# Patient Record
Sex: FEMALE | Race: WHITE | NOT HISPANIC OR LATINO | Employment: FULL TIME | URBAN - METROPOLITAN AREA
[De-identification: names, ages, dates, MRNs, and addresses within clinical notes are randomized per-mention and may not be internally consistent; named-entity substitution may affect disease eponyms.]

---

## 2017-03-15 ENCOUNTER — OFFICE VISIT (OUTPATIENT)
Dept: FAMILY MEDICINE CLINIC | Facility: CLINIC | Age: 27
End: 2017-03-15

## 2017-03-15 ENCOUNTER — APPOINTMENT (OUTPATIENT)
Dept: LAB | Facility: HOSPITAL | Age: 27
End: 2017-03-15

## 2017-03-15 VITALS
HEIGHT: 69 IN | SYSTOLIC BLOOD PRESSURE: 126 MMHG | WEIGHT: 254 LBS | TEMPERATURE: 98.8 F | BODY MASS INDEX: 37.62 KG/M2 | HEART RATE: 89 BPM | DIASTOLIC BLOOD PRESSURE: 80 MMHG | OXYGEN SATURATION: 98 %

## 2017-03-15 DIAGNOSIS — E66.9 OBESITY (BMI 30-39.9): ICD-10-CM

## 2017-03-15 DIAGNOSIS — R53.83 OTHER FATIGUE: Primary | ICD-10-CM

## 2017-03-15 LAB — TSH SERPL DL<=0.05 MIU/L-ACNC: 1.43 MIU/ML (ref 0.35–5.35)

## 2017-03-15 PROCEDURE — 84443 ASSAY THYROID STIM HORMONE: CPT | Performed by: PHYSICIAN ASSISTANT

## 2017-03-15 PROCEDURE — 36415 COLL VENOUS BLD VENIPUNCTURE: CPT | Performed by: PHYSICIAN ASSISTANT

## 2017-03-15 PROCEDURE — 99213 OFFICE O/P EST LOW 20 MIN: CPT | Performed by: PHYSICIAN ASSISTANT

## 2017-03-15 PROCEDURE — 99406 BEHAV CHNG SMOKING 3-10 MIN: CPT | Performed by: PHYSICIAN ASSISTANT

## 2017-03-15 RX ORDER — LEVOTHYROXINE SODIUM 0.03 MG/1
25 TABLET ORAL DAILY
Qty: 30 TABLET | Refills: 1 | Status: SHIPPED | OUTPATIENT
Start: 2017-03-15 | End: 2017-05-13 | Stop reason: SDUPTHER

## 2017-03-15 NOTE — PROGRESS NOTES
Subjective   Brenton Vila is a 27 y.o. female    History of Present Illness    Patient presents today for evaluation of obesity.  Patient states that her weight has continued to increase over the past couple of years despite following a diet low in calories, lowering carbs, high protein and staying active.  She states that prior to her 2-year-olds birth she weighed 165 pounds.  She states the highest her weight got when she was pregnant with her daughter was 211 pounds.  She got back down to 180 pounds after her daughter was born.  She states soon after that she developed increased weight without any significant change in her diet or activity and has had difficulty reducing it.  She states since December she has been following a diet less than 45 carbs per meal, exercising 30 minutes 3 times a week, she's cut out all fast food, she is stopped drinking sodas and has decreased her calories overall to less than 1200 jorge luis per day.  She states she has been diagnosed with polycystic ovary and syndrome was given metformin which did not help her weight.  She has only lost 1/2 pounds since December.  She is feeling very frustrated by this weight gain.  Additional she's felt more fatigue lately.  The following portions of the patient's history were reviewed and updated as appropriate: allergies, current medications, past social history and problem list    Review of Systems   Constitutional: Negative for activity change, appetite change and unexpected weight change.   Cardiovascular: Negative for chest pain.   Gastrointestinal: Negative for abdominal distention, abdominal pain, diarrhea and nausea.   Psychiatric/Behavioral: Negative for dysphoric mood. The patient is not nervous/anxious.        Objective     Vitals:    03/15/17 0813   BP: 126/80   Pulse: 89   Temp: 98.8 °F (37.1 °C)   SpO2: 98%       Physical Exam   Constitutional: She appears well-developed and well-nourished.   Obesity noted     Neck: No thyromegaly present.    Cardiovascular: Normal rate and regular rhythm.    Pulmonary/Chest: Effort normal and breath sounds normal.   Abdominal: Soft. There is no tenderness.   Psychiatric: She has a normal mood and affect. Her behavior is normal. Judgment and thought content normal.   Nursing note and vitals reviewed.    I counseled patient regarding the health benefits in smoking cessation for 3 minutes.  I also discussed with patient the increased health risks of continued smoking, including increased risk for cancers, increased risk for coronary artery disease, increased risk for stroke, heart attack, COPD and overall worsened lung function and increased lung infections.  We discussed different strategies for smoking cessation and prescriptions were offered to assist patient in smoking cessation.      Assessment/Plan     Diagnoses and all orders for this visit:    Other fatigue  -     TSH    Obesity (BMI 30-39.9)     I encouraged patient to continue following a diet less than 1200 hrs. per day, continue following a low carbohydrate and high protein diet and increase exercise to 30 minutes 5 times a week.  I have prescribed phentermine 37.5 mg daily to start on dispensed #30 with no refills.  Discussed drug abuse potential.  Negrito appropriate.  I'm going to check a TSH to rule out hypothyroidism and treat appropriately according to lab results as well.  She will follow-up for weight check in 1 month.

## 2017-03-27 ENCOUNTER — OFFICE VISIT (OUTPATIENT)
Dept: FAMILY MEDICINE CLINIC | Facility: CLINIC | Age: 27
End: 2017-03-27

## 2017-03-27 VITALS
HEART RATE: 85 BPM | BODY MASS INDEX: 36.43 KG/M2 | OXYGEN SATURATION: 100 % | HEIGHT: 69 IN | WEIGHT: 246 LBS | SYSTOLIC BLOOD PRESSURE: 122 MMHG | DIASTOLIC BLOOD PRESSURE: 80 MMHG | TEMPERATURE: 98.4 F

## 2017-03-27 DIAGNOSIS — J01.00 ACUTE MAXILLARY SINUSITIS, RECURRENCE NOT SPECIFIED: Primary | ICD-10-CM

## 2017-03-27 PROCEDURE — 99213 OFFICE O/P EST LOW 20 MIN: CPT | Performed by: PHYSICIAN ASSISTANT

## 2017-03-27 RX ORDER — PHENTERMINE HYDROCHLORIDE 37.5 MG/1
37.5 TABLET ORAL
COMMUNITY
End: 2018-01-02

## 2017-03-27 RX ORDER — CEFDINIR 300 MG/1
300 CAPSULE ORAL 2 TIMES DAILY
Qty: 14 CAPSULE | Refills: 0 | Status: SHIPPED | OUTPATIENT
Start: 2017-03-27 | End: 2017-04-12

## 2017-03-27 NOTE — PROGRESS NOTES
Subjective   Brenton Vila is a 27 y.o. female    History of Present Illness    The following portions of the patient's history were reviewed and updated as appropriate: allergies, current medications, past social history and problem list    Review of Systems   Constitutional: Negative for chills, fatigue and fever.   HENT: Positive for congestion, ear pain, postnasal drip, rhinorrhea and sinus pressure. Negative for sore throat.    Eyes: Positive for pain.   Respiratory: Positive for cough. Negative for shortness of breath.    Neurological: Positive for headaches. Negative for dizziness.   Hematological: Negative for adenopathy.       Objective     Vitals:    03/27/17 0956   BP: 122/80   Pulse: 85   Temp: 98.4 °F (36.9 °C)   SpO2: 100%       Physical Exam   Constitutional: She appears well-developed and well-nourished.   HENT:   Head: Normocephalic and atraumatic.   Right Ear: Tympanic membrane and ear canal normal.   Left Ear: Tympanic membrane and ear canal normal.   Nose: Mucosal edema, rhinorrhea and sinus tenderness present. Right sinus exhibits maxillary sinus tenderness and frontal sinus tenderness. Left sinus exhibits maxillary sinus tenderness and frontal sinus tenderness.   Mouth/Throat: Oropharynx is clear and moist. No oropharyngeal exudate.   Eyes: Pupils are equal, round, and reactive to light.   Cardiovascular: Normal rate and regular rhythm.    Pulmonary/Chest: Effort normal and breath sounds normal.   Nursing note and vitals reviewed.      Assessment/Plan     Diagnoses and all orders for this visit:    Acute maxillary sinusitis, recurrence not specified    Other orders  -     PROAIR  (90 BASE) MCG/ACT inhaler; Inhale 2 sprays Daily As Needed.  -     phentermine (ADIPEX-P) 37.5 MG tablet; Take 37.5 mg by mouth Every Morning Before Breakfast.  -     cefdinir (OMNICEF) 300 MG capsule; Take 1 capsule by mouth 2 (Two) Times a Day.  -     Chlorcyclizine-Pseudoephed 25-60 MG tablet; Take 1/2 to 1  twice daily for congestion   counseled patient less than 3 minutes to stop smoking.

## 2017-04-12 ENCOUNTER — OFFICE VISIT (OUTPATIENT)
Dept: FAMILY MEDICINE CLINIC | Facility: CLINIC | Age: 27
End: 2017-04-12

## 2017-04-12 VITALS
DIASTOLIC BLOOD PRESSURE: 86 MMHG | HEIGHT: 69 IN | WEIGHT: 240 LBS | HEART RATE: 71 BPM | BODY MASS INDEX: 35.55 KG/M2 | OXYGEN SATURATION: 99 % | TEMPERATURE: 97.9 F | SYSTOLIC BLOOD PRESSURE: 126 MMHG

## 2017-04-12 DIAGNOSIS — E66.9 OBESITY (BMI 30-39.9): Primary | ICD-10-CM

## 2017-04-12 PROCEDURE — 99213 OFFICE O/P EST LOW 20 MIN: CPT | Performed by: PHYSICIAN ASSISTANT

## 2017-05-11 ENCOUNTER — OFFICE VISIT (OUTPATIENT)
Dept: FAMILY MEDICINE CLINIC | Facility: CLINIC | Age: 27
End: 2017-05-11

## 2017-05-11 VITALS
DIASTOLIC BLOOD PRESSURE: 78 MMHG | SYSTOLIC BLOOD PRESSURE: 120 MMHG | BODY MASS INDEX: 34.8 KG/M2 | OXYGEN SATURATION: 98 % | WEIGHT: 235 LBS | HEIGHT: 69 IN | HEART RATE: 95 BPM | TEMPERATURE: 97.8 F

## 2017-05-11 DIAGNOSIS — L91.8 SKIN TAG: ICD-10-CM

## 2017-05-11 DIAGNOSIS — E66.9 OBESITY (BMI 30-39.9): Primary | ICD-10-CM

## 2017-05-11 PROCEDURE — 11200 RMVL SKIN TAGS UP TO&INC 15: CPT | Performed by: PHYSICIAN ASSISTANT

## 2017-05-11 PROCEDURE — 99213 OFFICE O/P EST LOW 20 MIN: CPT | Performed by: PHYSICIAN ASSISTANT

## 2017-05-11 RX ORDER — MEDROXYPROGESTERONE ACETATE 10 MG/1
TABLET ORAL
COMMUNITY
Start: 2017-04-27 | End: 2017-06-08

## 2017-05-15 RX ORDER — LEVOTHYROXINE SODIUM 0.03 MG/1
TABLET ORAL
Qty: 30 TABLET | Refills: 3 | Status: SHIPPED | OUTPATIENT
Start: 2017-05-15 | End: 2017-08-03 | Stop reason: SDUPTHER

## 2017-06-08 ENCOUNTER — OFFICE VISIT (OUTPATIENT)
Dept: FAMILY MEDICINE CLINIC | Facility: CLINIC | Age: 27
End: 2017-06-08

## 2017-06-08 VITALS
TEMPERATURE: 98.5 F | SYSTOLIC BLOOD PRESSURE: 118 MMHG | HEIGHT: 69 IN | BODY MASS INDEX: 35.1 KG/M2 | DIASTOLIC BLOOD PRESSURE: 80 MMHG | HEART RATE: 91 BPM | OXYGEN SATURATION: 99 % | WEIGHT: 237 LBS

## 2017-06-08 DIAGNOSIS — E28.2 PCOS (POLYCYSTIC OVARIAN SYNDROME): ICD-10-CM

## 2017-06-08 DIAGNOSIS — E66.9 OBESITY (BMI 30-39.9): Primary | ICD-10-CM

## 2017-06-08 PROCEDURE — 99214 OFFICE O/P EST MOD 30 MIN: CPT | Performed by: PHYSICIAN ASSISTANT

## 2017-06-08 RX ORDER — TOPIRAMATE 25 MG/1
TABLET ORAL
Qty: 60 TABLET | Refills: 1 | Status: SHIPPED | OUTPATIENT
Start: 2017-06-08 | End: 2017-07-07 | Stop reason: SDUPTHER

## 2017-06-08 NOTE — PROGRESS NOTES
Subjective   Brenton Vila is a 27 y.o. female    History of Present Illness  Patient presents today for follow-up on management of obesity and weight loss with prescription of phentermine needing to be refilled.  Unfortunate patient's weight is up 2 pounds.  She states she has continued to have difficulty with her polycystic ovarian syndrome with amenorrhea and was given a 10 day course of Provera to take throughout this past month.  She states this causes her to have food cravings and fluid retention.  She needs a referral to new gynecologist because her doctor has moved.  She has done 2 pregnancy test this month which were negative.  She has done well on Phenergan in the past prior to this month.  She is walking for exercise daily and drinking only water, she has been frustrated by her weight gain this month.  She is following a high-protein low-carb diet.  Patient has tried taking metformin the past for polycystic ovarian syndrome without any benefit regarding cycle regulation or weight loss.  She had significant GI side effects from it however.  The following portions of the patient's history were reviewed and updated as appropriate: allergies, current medications, past social history and problem list    Review of Systems   Constitutional: Positive for activity change and appetite change. Negative for unexpected weight change.   Cardiovascular: Negative for chest pain.   Gastrointestinal: Negative for abdominal distention, abdominal pain, diarrhea and nausea.   Genitourinary: Positive for menstrual problem.   Psychiatric/Behavioral: Negative for dysphoric mood. The patient is not nervous/anxious.        Objective     Vitals:    06/08/17 0805   BP: 118/80   Pulse: 91   Temp: 98.5 °F (36.9 °C)   SpO2: 99%       Physical Exam   Constitutional: She appears well-developed and well-nourished.   Obesity noted     Neck: No thyromegaly present.   Cardiovascular: Normal rate and regular rhythm.    Pulmonary/Chest: Effort  normal and breath sounds normal.   Abdominal: Soft. There is no tenderness.   Psychiatric: She has a normal mood and affect. Her behavior is normal. Judgment and thought content normal.   Nursing note and vitals reviewed.      Assessment/Plan     Diagnoses and all orders for this visit:    Obesity (BMI 30-39.9)    PCOS (polycystic ovarian syndrome)  -     Ambulatory Referral to Obstetrics / Gynecology    Other orders  -     topiramate (TOPAMAX) 25 MG tablet; Take one each morning for one week then 2 together each morning    Refilled phentermine as well 37.5 mg 1 daily dispensed #30 with no refills, discussed abuse potential this medication and importance of not conceiving a pregnancy while on either this or Topamax.  Patient expresses understanding and commitment to avoid conception at this time.  She will continue following a high-protein low-carb low-calorie diet, continue with exercise and follow-up in one month for recheck of weight.

## 2017-06-25 PROBLEM — Z01.419 WELL WOMAN EXAM: Status: ACTIVE | Noted: 2017-06-25

## 2017-06-26 ENCOUNTER — OFFICE VISIT (OUTPATIENT)
Dept: OBSTETRICS AND GYNECOLOGY | Facility: CLINIC | Age: 27
End: 2017-06-26

## 2017-06-26 VITALS
SYSTOLIC BLOOD PRESSURE: 128 MMHG | DIASTOLIC BLOOD PRESSURE: 80 MMHG | BODY MASS INDEX: 34.66 KG/M2 | RESPIRATION RATE: 14 BRPM | WEIGHT: 234 LBS | HEIGHT: 69 IN

## 2017-06-26 DIAGNOSIS — L68.0 HIRSUTISM: ICD-10-CM

## 2017-06-26 DIAGNOSIS — N91.5 OLIGOMENORRHEA: Primary | ICD-10-CM

## 2017-06-26 PROCEDURE — 99203 OFFICE O/P NEW LOW 30 MIN: CPT | Performed by: OBSTETRICS & GYNECOLOGY

## 2017-06-26 NOTE — PROGRESS NOTES
"Subjective   Chief Complaint   Patient presents with   • [facility]     Brenton Vila is a 27 y.o. year old .  Patient's last menstrual period was 2017 (exact date).  She presents to be seen because of her Primary care physician diagnosing her with polycystic ovarian syndrome.  Currently she does not hope to conceive.  She is working on weight loss.  She is taking a combination of Topamax and phentermine through her primary care physician.     Her cycles in the last year have remained unpredictable.  She has been given Provera at times to induce a cycle.  In the absence of Provera she does not bleed.  In the past she did try Clomid for several months without success.  This was being prescribed through Dr. Bela Campos.    The following portions of the patient's history were reviewed and updated as appropriate:current medications, allergies, past family history, past medical history, past social history and past surgical history    Smoking status: Current Every Day Smoker                                                   Packs/day: 0.50      Years: 0.00         Types: Cigarettes     Start date:   Smokeless status: Never Used                             Objective   /80  Resp 14  Ht 69\" (175.3 cm)  Wt 234 lb (106 kg)  LMP 2017 (Exact Date)  Breastfeeding? No  BMI 34.56 kg/m2    Lab Review   No data reviewed    Imaging   No data reviewed        Assessment   1. Irregular menses with hirsutism and changes consistent with polycystic ovarian syndrome  2. Obesity  3. Sub-optimal BC - she currently is not taking sufficient folic acid     Plan   1. Pathophysiology for polycystic ovarian syndrome was reviewed.  The importance of weight loss hoping to regress the etiologic factors was emphasized.  The association with hyperinsulinism, hirsutism, irregular menses and anovulation were discussed.  2. For now I see no benefit in beginning oral contraceptives solely to regulate her menses.  I do " not think this will alter the progression of her disease process.  3. The following data needs to be obtained to update her medical records: last PAP.  4. Folic acid for the prevention of neural tube defects was discussed.  At least 0.4 mg should be taken preconceptionally to reduce the risk.  It was explained that this may reduce the baseline incidence of neural tube defect by as much as 65%.  Folic acid can be found in OTC multivitamins, OTC prenatal vitamins or breakfast cereal that that contains 100% of the RDA of folate.  5. Follow up for annual exam 3 months         This note was electronically signed.    Johny Barakat M.D.  June 26, 2017    Total time spent today with Brenton  was 35 minutes (level 3).  Off this time, 100% was spent face-to-face time coordinating care, answering her questions and counseling regarding pathophysiology of her presenting problem along with plans for any diagnositc work-up and treatment.

## 2017-07-07 ENCOUNTER — OFFICE VISIT (OUTPATIENT)
Dept: FAMILY MEDICINE CLINIC | Facility: CLINIC | Age: 27
End: 2017-07-07

## 2017-07-07 VITALS
BODY MASS INDEX: 34.21 KG/M2 | OXYGEN SATURATION: 100 % | HEART RATE: 85 BPM | SYSTOLIC BLOOD PRESSURE: 122 MMHG | HEIGHT: 69 IN | DIASTOLIC BLOOD PRESSURE: 78 MMHG | WEIGHT: 231 LBS | TEMPERATURE: 97.8 F

## 2017-07-07 DIAGNOSIS — E66.9 OBESITY (BMI 30-39.9): Primary | ICD-10-CM

## 2017-07-07 PROCEDURE — 99213 OFFICE O/P EST LOW 20 MIN: CPT | Performed by: PHYSICIAN ASSISTANT

## 2017-07-07 RX ORDER — TOPIRAMATE 25 MG/1
TABLET ORAL
Qty: 60 TABLET | Refills: 5 | Status: SHIPPED | OUTPATIENT
Start: 2017-07-07 | End: 2017-08-03 | Stop reason: SDUPTHER

## 2017-07-07 NOTE — PROGRESS NOTES
Subjective   Brenton Vila is a 27 y.o. female    History of Present Illness  Patient presents today for one-month follow-up on weight loss management, weight is down 6 pounds.  Patient is currently on Topamax and phentermine.  Blood pressures well-controlled.  Patient states the Topamax is been tremendous in helping her to reduce this sugar content of her diet.  She states she's been able to get off of sweet tea, off of Mountain Dew come off of red bull and is now drinking water as her primary drink.  She feels that she has been able to reduce her overall portion size of her meals, overall daily caloric intake and significant decrease in simple sugars.  Patient states initially she had some tingling in a couple of her fingers with starting Topamax for this is gone away.  She does have a history of borderline low B12 but does not take shots at this time.  She denies any tingling or numbness in her fingers today.  The following portions of the patient's history were reviewed and updated as appropriate: allergies, current medications, past social history and problem list    Review of Systems   Constitutional: Positive for activity change and appetite change. Negative for unexpected weight change.   Cardiovascular: Negative for chest pain.   Gastrointestinal: Negative for abdominal distention, abdominal pain, diarrhea and nausea.   Psychiatric/Behavioral: Negative for dysphoric mood. The patient is not nervous/anxious.        Objective     Vitals:    07/07/17 0814   BP: 122/78   Pulse: 85   Temp: 97.8 °F (36.6 °C)   SpO2: 100%       Physical Exam   Constitutional: She appears well-developed and well-nourished.   Obesity noted     Neck: No thyromegaly present.   Cardiovascular: Normal rate and regular rhythm.    Pulmonary/Chest: Effort normal and breath sounds normal.   Abdominal: Soft. There is no tenderness.   Psychiatric: She has a normal mood and affect. Her behavior is normal. Judgment and thought content normal.    Nursing note and vitals reviewed.      Assessment/Plan     Diagnoses and all orders for this visit:    Obesity (BMI 30-39.9)    Other orders  -     topiramate (TOPAMAX) 25 MG tablet; Take one each morning for one week then 2 together each morning   continue on Topamax, refill given of phentermine 37.5 mg 1 daily #30 with no refills, discussed abuse potential this medicine, Andrew appropriate.  Encouraged patient to continue with current low carb, low calorie diet and follow-up in one month for recheck of weight.

## 2017-08-03 ENCOUNTER — OFFICE VISIT (OUTPATIENT)
Dept: FAMILY MEDICINE CLINIC | Facility: CLINIC | Age: 27
End: 2017-08-03

## 2017-08-03 VITALS
TEMPERATURE: 98.1 F | HEART RATE: 81 BPM | HEIGHT: 69 IN | WEIGHT: 229 LBS | BODY MASS INDEX: 33.92 KG/M2 | DIASTOLIC BLOOD PRESSURE: 82 MMHG | SYSTOLIC BLOOD PRESSURE: 118 MMHG | RESPIRATION RATE: 14 BRPM | OXYGEN SATURATION: 99 %

## 2017-08-03 DIAGNOSIS — Z71.6 TOBACCO ABUSE COUNSELING: ICD-10-CM

## 2017-08-03 DIAGNOSIS — E66.9 OBESITY (BMI 30-39.9): ICD-10-CM

## 2017-08-03 DIAGNOSIS — E28.2 PCOS (POLYCYSTIC OVARIAN SYNDROME): ICD-10-CM

## 2017-08-03 DIAGNOSIS — E03.8 OTHER SPECIFIED HYPOTHYROIDISM: Primary | ICD-10-CM

## 2017-08-03 PROBLEM — Z72.0 TOBACCO ABUSE: Status: ACTIVE | Noted: 2017-08-03

## 2017-08-03 PROCEDURE — 99213 OFFICE O/P EST LOW 20 MIN: CPT | Performed by: PHYSICIAN ASSISTANT

## 2017-08-03 RX ORDER — LEVOTHYROXINE SODIUM 0.03 MG/1
25 TABLET ORAL DAILY
Qty: 30 TABLET | Refills: 5 | Status: SHIPPED | OUTPATIENT
Start: 2017-08-03 | End: 2018-01-09

## 2017-08-03 RX ORDER — TOPIRAMATE 25 MG/1
TABLET ORAL
Qty: 60 TABLET | Refills: 5 | Status: SHIPPED | OUTPATIENT
Start: 2017-08-03 | End: 2017-09-05 | Stop reason: SDUPTHER

## 2017-08-03 RX ORDER — NICOTINE 21 MG/24HR
1 PATCH, TRANSDERMAL 24 HOURS TRANSDERMAL EVERY 24 HOURS
Qty: 14 PATCH | Refills: 3 | Status: SHIPPED | OUTPATIENT
Start: 2017-08-03 | End: 2017-08-04 | Stop reason: SINTOL

## 2017-08-03 NOTE — PROGRESS NOTES
Subjective   Brenton Vila is a 27 y.o. female    History of Present Illness  Patient presents today for 3 separate concerns one is for follow-up on hypothyroidism, secondly follow-up on weight loss management in association with obesity and thirdly she would like some assistance with smoking cessation.  Patient has tried smoking cessation in the past, she has tried vaping and it caused her to have more burning in her chest.  She has tried nicotine gum and it caused her mouth to burn.  She has not tried a nicotine patch.  She has been a pack per day smoker since she was about 15 years old.  She is very much interested in stopping.  She has cut down to 7-9 cigarettes per day over the past week but is having a lot of anxiety and nervous energy since doing so.  She is doing well on Topamax and phentermine for weight loss she finds that both these medications are helping her to have appetite control, she has lowered her portion sizes and is consuming much less sugar.  She is drinking 6 bottles of water per day.  She feels good, sleeping well at night.  The following portions of the patient's history were reviewed and updated as appropriate: allergies, current medications, past social history and problem list    Review of Systems   Constitutional: Positive for activity change ( Stay more active with increased energy) and appetite change. Negative for fatigue and unexpected weight change.   Eyes: Negative for visual disturbance.   Respiratory: Positive for cough ( Chronic secondary to smoking). Negative for shortness of breath.    Cardiovascular: Negative for chest pain and palpitations.   Gastrointestinal: Negative for abdominal distention, abdominal pain, constipation, diarrhea and nausea.   Endocrine: Negative for cold intolerance and heat intolerance.   Neurological: Negative for tremors.   Psychiatric/Behavioral: Negative for agitation and dysphoric mood. The patient is not nervous/anxious.        Objective      Vitals:    08/03/17 0756   BP: 118/82   Pulse: 81   Resp: 14   Temp: 98.1 °F (36.7 °C)   SpO2: 99%       Physical Exam   Constitutional: She appears well-developed and well-nourished.   Obesity noted     HENT:   Head: Normocephalic.   No Exopthalmos   Neck: No JVD present. No thyromegaly present.   Cardiovascular: Normal rate and regular rhythm.    Pulmonary/Chest: Effort normal and breath sounds normal.   Abdominal: Soft. There is no tenderness.   Lymphadenopathy:     She has no cervical adenopathy.   Skin: Skin is warm and dry.   Psychiatric: She has a normal mood and affect. Her behavior is normal. Judgment and thought content normal.   Nursing note and vitals reviewed.    I counseled patient regarding the health benefits in smoking cessation for 4 minutes.  I also discussed with patient the increased health risks of continued smoking, including increased risk for cancers, increased risk for coronary artery disease, increased risk for stroke, heart attack, COPD and overall worsened lung function and increased lung infections.  We discussed different strategies for smoking cessation and prescriptions were offered to assist patient in smoking cessation.         Assessment/Plan     Diagnoses and all orders for this visit:    Other specified hypothyroidism    Tobacco abuse counseling    PCOS (polycystic ovarian syndrome)    Obesity (BMI 30-39.9)    Other orders  -     nicotine (NICOTINE STEP 2) 14 MG/24HR patch; Place 1 patch on the skin Daily.  -     topiramate (TOPAMAX) 25 MG tablet; Take one each morning for one week then 2 together each morning  -     levothyroxine (SYNTHROID, LEVOTHROID) 25 MCG tablet; Take 1 tablet by mouth Daily.    Refilled phentermine 37.5 mg 1 daily #30 with no refills.  Discussed abuse potential this medication.  Encouraged continued low carb, low calorie diet, follow-up in one month for recheck.

## 2017-08-07 ENCOUNTER — TELEPHONE (OUTPATIENT)
Dept: OBSTETRICS AND GYNECOLOGY | Facility: CLINIC | Age: 27
End: 2017-08-07

## 2017-08-07 ENCOUNTER — TELEPHONE (OUTPATIENT)
Dept: FAMILY MEDICINE CLINIC | Facility: CLINIC | Age: 27
End: 2017-08-07

## 2017-08-07 NOTE — TELEPHONE ENCOUNTER
Pt notified. States since she has called this morning she has noticed the bleeding is not from her vagina it is in her urine. She has contacted her PCP.

## 2017-08-07 NOTE — TELEPHONE ENCOUNTER
----- Message from Jo-Ann De La Rosa sent at 8/7/2017  8:42 AM EDT -----  Dr Barakat pt has been having abnormal spotting for 11 days.  She has not had a period in over 2 years due to PCOS.   What should she do?

## 2017-08-07 NOTE — TELEPHONE ENCOUNTER
This bleeding pattern is not unusual for polycystic ovarian syndrome.  So long as she is not pregnant, nothing has to be done.  If the bleeding bothers her we can give her Provera 10 mg for 10 days to see if this makes a difference

## 2017-09-05 ENCOUNTER — OFFICE VISIT (OUTPATIENT)
Dept: FAMILY MEDICINE CLINIC | Facility: CLINIC | Age: 27
End: 2017-09-05

## 2017-09-05 VITALS
RESPIRATION RATE: 14 BRPM | SYSTOLIC BLOOD PRESSURE: 128 MMHG | TEMPERATURE: 98.4 F | WEIGHT: 234.6 LBS | BODY MASS INDEX: 34.75 KG/M2 | DIASTOLIC BLOOD PRESSURE: 72 MMHG | HEART RATE: 90 BPM | OXYGEN SATURATION: 99 % | HEIGHT: 69 IN

## 2017-09-05 DIAGNOSIS — R60.9 EDEMA, UNSPECIFIED TYPE: ICD-10-CM

## 2017-09-05 DIAGNOSIS — E28.2 PCOS (POLYCYSTIC OVARIAN SYNDROME): Primary | ICD-10-CM

## 2017-09-05 DIAGNOSIS — E66.9 OBESITY (BMI 30-39.9): ICD-10-CM

## 2017-09-05 PROCEDURE — 99214 OFFICE O/P EST MOD 30 MIN: CPT | Performed by: PHYSICIAN ASSISTANT

## 2017-09-05 RX ORDER — SPIRONOLACTONE 50 MG/1
50 TABLET, FILM COATED ORAL DAILY
Qty: 30 TABLET | Refills: 1 | Status: SHIPPED | OUTPATIENT
Start: 2017-09-05 | End: 2017-11-12 | Stop reason: SDUPTHER

## 2017-09-05 RX ORDER — TOPIRAMATE 25 MG/1
TABLET ORAL
Qty: 60 TABLET | Refills: 5 | Status: SHIPPED | OUTPATIENT
Start: 2017-09-05 | End: 2018-01-02

## 2017-09-05 RX ORDER — METFORMIN HYDROCHLORIDE 500 MG/1
TABLET, EXTENDED RELEASE ORAL
Qty: 60 TABLET | Refills: 2 | Status: SHIPPED | OUTPATIENT
Start: 2017-09-05 | End: 2018-01-02

## 2017-09-05 NOTE — PROGRESS NOTES
Subjective   Brenton Vila is a 27 y.o. female    History of Present Illness  Patient presents today for follow-up on weight loss management in association with obesity.  Unfortunately her weight is up 5 pounds.  She's been expressing more swelling lately, she is having irregular menstrual cycles that she has had for many years, she has polycystic ovarian syndrome.  Her cycles have somewhat regulated since losing some weight but she continues having irregularities.  She is to take metformin and will like to try getting back on this.  She feels like she is retaining fluid.  She is drinking water continuously throughout the day and trying to follow a low-sodium diet.  The following portions of the patient's history were reviewed and updated as appropriate: allergies, current medications, past social history and problem list    Review of Systems   Constitutional: Positive for activity change and appetite change. Negative for unexpected weight change.   Cardiovascular: Positive for leg swelling. Negative for chest pain.   Gastrointestinal: Negative for abdominal distention, abdominal pain, diarrhea and nausea.   Genitourinary: Positive for menstrual problem.   Psychiatric/Behavioral: Negative for dysphoric mood. The patient is not nervous/anxious.        Objective     Vitals:    09/05/17 0803   BP: 128/72   Pulse: 90   Resp: 14   Temp: 98.4 °F (36.9 °C)   SpO2: 99%       Physical Exam   Constitutional: She appears well-developed and well-nourished.   Obesity noted     Neck: No thyromegaly present.   Cardiovascular: Normal rate and regular rhythm.    Pulmonary/Chest: Effort normal and breath sounds normal.   Abdominal: Soft. There is no tenderness.   Musculoskeletal: She exhibits edema ( Mild edema noted in hands, rings are tight, ankles no edema).   Neurological: A cranial nerve deficit is present.   Psychiatric: She has a normal mood and affect. Her behavior is normal. Judgment and thought content normal.   Nursing  note and vitals reviewed.      Assessment/Plan     Diagnoses and all orders for this visit:    PCOS (polycystic ovarian syndrome)  -     metFORMIN ER (GLUCOPHAGE-XR) 500 MG 24 hr tablet; Take 2 with supper For PCOS    Obesity (BMI 30-39.9)  -     topiramate (TOPAMAX) 25 MG tablet; Take 2 together each morning  -     metFORMIN ER (GLUCOPHAGE-XR) 500 MG 24 hr tablet; Take 2 with supper For PCOS    Edema, unspecified type  -     spironolactone (ALDACTONE) 50 MG tablet; Take 1 tablet by mouth Daily. As needed for fluid retention  Refill phentermine 37.5 mg 1 daily #30 with no refills, encouraged patient continue with low sodium, low carb and high protein diet, follow-up in one month for recheck, will check BMP at that time.

## 2017-09-27 ENCOUNTER — OFFICE VISIT (OUTPATIENT)
Dept: OBSTETRICS AND GYNECOLOGY | Facility: CLINIC | Age: 27
End: 2017-09-27

## 2017-09-27 VITALS
DIASTOLIC BLOOD PRESSURE: 72 MMHG | BODY MASS INDEX: 34.36 KG/M2 | SYSTOLIC BLOOD PRESSURE: 118 MMHG | HEIGHT: 69 IN | WEIGHT: 232 LBS | RESPIRATION RATE: 14 BRPM

## 2017-09-27 DIAGNOSIS — Z01.419 WELL WOMAN EXAM: Primary | ICD-10-CM

## 2017-09-27 DIAGNOSIS — N87.0 MILD DYSPLASIA OF CERVIX (CIN I): ICD-10-CM

## 2017-09-27 PROBLEM — Z72.0 TOBACCO ABUSE: Status: RESOLVED | Noted: 2017-08-03 | Resolved: 2017-09-27

## 2017-09-27 PROCEDURE — 99395 PREV VISIT EST AGE 18-39: CPT | Performed by: OBSTETRICS & GYNECOLOGY

## 2017-09-27 NOTE — PROGRESS NOTES
Subjective   Chief Complaint   Patient presents with   • Gynecologic Exam     Brenton Vila is a 27 y.o. year old  presenting to be seen for her annual exam.     SEXUAL Hx:  She is currently sexually active.  In the past year there has not been new sexual partners.    Condoms are never used.  She would not like to be screened for STD's at today's exam.  Current birth control method: not using any form of contraception.  She is not trying to conceive but would be OK if she did get pregnant.  She is happy with her current method of contraception and does not want to discuss alternative methods of contraception.  MENSTRUAL Hx:  Patient's last menstrual period was 2017 (exact date).  In the past 6 months her cycles have been unpredictable.  Her menstrual flow is typically normal.   Each month on average there are roughly 0 day(s) of very heavy flow.    Intermenstrual bleeding is absent.    Post-coital bleeding is absent.  Dysmenorrhea: moderate and is not affecting her activities of daily living  PMS: moderate and is not affecting her activities of daily living  Her cycles are not a source of concern for her that she wishes to discuss today.  HEALTH Hx:  She exercises regularly: yes.  She wears her seat belt: no.  She has concerns about domestic violence: no.  OTHER THINGS SHE WANTS TO DISCUSS TODAY:  Nothing else    The following portions of the patient's history were reviewed and updated as appropriate:problem list, current medications, allergies, past family history, past medical history, past social history and past surgical history.    Smoking status: Former Smoker                                                              Packs/day: 0.50      Years: 0.00         Types: Cigarettes     Start date:   Smokeless status: Never Used                        Review of Systems  Constitutional POS: nothing reported    NEG: anorexia or night sweats   Gastointestinal POS: nothing reported    NEG: bloating,  "change in bowel habits, melena or reflux symptoms   Genitourinary POS: nothing reported and WENDY is present but it IS NOT effecting her ADL's    NEG: dysuria or hematuria   Integument POS: nothing reported    NEG: moles that are changing in size, shape, color or rashes   Breast POS: nothing reported    NEG: persistent breast lump, skin dimpling or nipple discharge        Objective   /72  Resp 14  Ht 69\" (175.3 cm)  Wt 232 lb (105 kg)  LMP 09/01/2017 (Exact Date)  Breastfeeding? No  BMI 34.26 kg/m2    General:  well developed; well nourished  no acute distress   Skin:  No suspicious lesions seen   Thyroid: normal to inspection and palpation   Breasts:  Examined in supine position  Symmetric without masses or skin dimpling  Nipples normal without inversion, lesions or discharge  There are no palpable axillary nodes   Abdomen: soft, non-tender; no masses  no umbilical or inginual hernias are present  no hepato-splenomegaly   Pelvis: Clinical staff was present for exam  External genitalia:  normal appearance of the external genitalia including Bartholin's and [place]'s glands.  :  urethral meatus normal;  Vaginal:  normal pink mucosa without prolapse or lesions.  Cervix:  normal appearance.  Uterus:  normal size, shape and consistency.  Adnexa:  normal bimanual exam of the adnexa.  Rectal:  digital rectal exam not performed; anus visually normal appearing.        Assessment   1. Normal GYN exam  2. LGSIL  3. Ex-smoker  4. Sub-optimal BC - she currently is taking sufficient folic acid     Plan   1. Pap was done today.  If she does not receive the results of the Pap within 2 weeks  time, she was instructed to call to find out the results.  I explained to Brenton that because she is being seen in follow-up of a previously abnormal Pap smear, her next Pap needs to be performed in 4-6 months.  She will need to be seen roughly every 6 months until 3 consecutive normal Paps have been obtained.  At that point, she can " return to routine screening intervals.  2. Set belt use was stressed  3. No prescription was given or electronically sent at today's visit   4. The importance of keeping all planned follow-up was emphasized.  5. The importance of taking all medications as prescribed was also emphasized.  6. Follow up for repeat PAP smear 6 months           This note was electronically signed.    Johny Barakat M.D.  September 27, 2017    Note: Speech recognition transcription software may have been used to create portions of this document.  An attempt at proofreading has been made but errors in transcription could still be present.

## 2017-10-02 ENCOUNTER — TELEPHONE (OUTPATIENT)
Dept: OBSTETRICS AND GYNECOLOGY | Facility: CLINIC | Age: 27
End: 2017-10-02

## 2017-10-04 ENCOUNTER — TELEPHONE (OUTPATIENT)
Dept: OBSTETRICS AND GYNECOLOGY | Facility: CLINIC | Age: 27
End: 2017-10-04

## 2017-10-04 NOTE — TELEPHONE ENCOUNTER
----- Message from Stephanie Blue MA sent at 10/4/2017  3:17 PM EDT -----  Regarding: FW: Test Results Question  Contact: [phone]      ----- Message -----     From: Brenton Vila     Sent: 10/4/2017   2:42 PM       To: [facility]  Subject: Test Results Question                            Can you please add my pathology and pap results to my chart so I can view them. Thanks.

## 2017-10-04 NOTE — TELEPHONE ENCOUNTER
Called and left message that I don't know the weight of post this result so it is visible by my chart.  I would just send a message to her on my chart detailing exactly what's been going on.

## 2017-10-05 ENCOUNTER — OFFICE VISIT (OUTPATIENT)
Dept: FAMILY MEDICINE CLINIC | Facility: CLINIC | Age: 27
End: 2017-10-05

## 2017-10-05 ENCOUNTER — APPOINTMENT (OUTPATIENT)
Dept: LAB | Facility: HOSPITAL | Age: 27
End: 2017-10-05

## 2017-10-05 ENCOUNTER — TELEPHONE (OUTPATIENT)
Dept: OBSTETRICS AND GYNECOLOGY | Facility: CLINIC | Age: 27
End: 2017-10-05

## 2017-10-05 VITALS
HEIGHT: 69 IN | SYSTOLIC BLOOD PRESSURE: 120 MMHG | RESPIRATION RATE: 14 BRPM | WEIGHT: 230 LBS | BODY MASS INDEX: 34.07 KG/M2 | OXYGEN SATURATION: 99 % | HEART RATE: 78 BPM | TEMPERATURE: 98.5 F | DIASTOLIC BLOOD PRESSURE: 82 MMHG

## 2017-10-05 DIAGNOSIS — E66.9 OBESITY (BMI 30-39.9): ICD-10-CM

## 2017-10-05 DIAGNOSIS — J06.9 URI, ACUTE: Primary | ICD-10-CM

## 2017-10-05 DIAGNOSIS — Z34.90 PREGNANCY, UNSPECIFIED GESTATIONAL AGE: Primary | ICD-10-CM

## 2017-10-05 LAB — HCG INTACT+B SERPL-ACNC: <5 MIU/ML

## 2017-10-05 PROCEDURE — 36415 COLL VENOUS BLD VENIPUNCTURE: CPT | Performed by: OBSTETRICS & GYNECOLOGY

## 2017-10-05 PROCEDURE — 99214 OFFICE O/P EST MOD 30 MIN: CPT | Performed by: PHYSICIAN ASSISTANT

## 2017-10-05 PROCEDURE — 84702 CHORIONIC GONADOTROPIN TEST: CPT | Performed by: OBSTETRICS & GYNECOLOGY

## 2017-10-05 RX ORDER — AMOXICILLIN 875 MG/1
875 TABLET, COATED ORAL 2 TIMES DAILY
Qty: 14 TABLET | Refills: 0 | Status: SHIPPED | OUTPATIENT
Start: 2017-10-05 | End: 2017-10-19

## 2017-10-05 NOTE — PROGRESS NOTES
Subjective   Brenton Vila is a 27 y.o. female    History of Present Illness  Patient comes in today with 2 separate concerns one is for follow-up on 1 chronic medical problem of obesity, she is doing well with weight going down on phentermine and request a refill.  She feels that is helping her with controlling her appetite.  She is following a low-calorie, low-carb diet.  Additionally she has one new symptom for the past 3 weeks of nasal congestion, postnasal drainage and cough.  She has had some facial pressure around her nose.  She is still smoking cigarettes but smokes very few, less than 5 per day.  The following portions of the patient's history were reviewed and updated as appropriate: allergies, current medications, past social history and problem list    Review of Systems   Constitutional: Positive for activity change and appetite change. Negative for fever and unexpected weight change.   HENT: Positive for congestion, postnasal drip, rhinorrhea, sneezing, sore throat and voice change. Negative for sinus pressure.    Respiratory: Positive for cough.    Cardiovascular: Negative for chest pain.   Gastrointestinal: Negative for abdominal distention, abdominal pain, diarrhea and nausea.   Psychiatric/Behavioral: Negative for dysphoric mood. The patient is not nervous/anxious.        Objective     Vitals:    10/05/17 0801   BP: 120/82   Pulse: 78   Resp: 14   Temp: 98.5 °F (36.9 °C)   SpO2: 99%       Physical Exam   Constitutional: She appears well-developed and well-nourished. No distress.   Obesity noted     HENT:   Head: Normocephalic and atraumatic.   Right Ear: External ear normal.   Left Ear: External ear normal.   Nose: Nose normal.   Mouth/Throat: Oropharynx is clear and moist. No oropharyngeal exudate.   Eyes: Conjunctivae are normal. Right eye exhibits no discharge. Left eye exhibits no discharge.   Neck: Normal range of motion. Neck supple. No thyromegaly present.   Cardiovascular: Normal rate,  regular rhythm and normal heart sounds.    Pulmonary/Chest: Effort normal and breath sounds normal. No respiratory distress.   Abdominal: Soft. There is no tenderness.   Lymphadenopathy:     She has no cervical adenopathy.   Skin: Skin is warm and dry. She is not diaphoretic.   Psychiatric: She has a normal mood and affect. Her behavior is normal. Judgment and thought content normal.   Nursing note and vitals reviewed.      Assessment/Plan     Diagnoses and all orders for this visit:    URI, acute  -     amoxicillin (AMOXIL) 875 MG tablet; Take 1 tablet by mouth 2 (Two) Times a Day.  -     Chlorcyclizine-Pseudoephed 25-60 MG tablet; Take 1/2 to 1 every 12 hours as needed for congestion    Obesity (BMI 30-39.9)

## 2017-10-06 ENCOUNTER — TELEPHONE (OUTPATIENT)
Dept: OBSTETRICS AND GYNECOLOGY | Facility: CLINIC | Age: 27
End: 2017-10-06

## 2017-10-06 NOTE — TELEPHONE ENCOUNTER
DR MADSEN PATIENT    HAD [facility] DRAWN YESTERDAY, LOOKED ON MYCHART AND SAW THAT IT WAS NEGATIVE    SHE'S HAD TWO POSITIVE URINE TEST    WANTS TO KNOW IF SHE CAN START TAKING MEDICINE AGAIN    PLEASE CALL HER

## 2017-10-19 ENCOUNTER — OFFICE VISIT (OUTPATIENT)
Dept: OBSTETRICS AND GYNECOLOGY | Facility: CLINIC | Age: 27
End: 2017-10-19

## 2017-10-19 VITALS
SYSTOLIC BLOOD PRESSURE: 118 MMHG | RESPIRATION RATE: 14 BRPM | BODY MASS INDEX: 33.52 KG/M2 | WEIGHT: 227 LBS | DIASTOLIC BLOOD PRESSURE: 72 MMHG

## 2017-10-19 DIAGNOSIS — N87.0 MILD DYSPLASIA OF CERVIX (CIN I): Primary | ICD-10-CM

## 2017-10-19 PROCEDURE — 57452 EXAM OF CERVIX W/SCOPE: CPT | Performed by: OBSTETRICS & GYNECOLOGY

## 2017-10-19 NOTE — PROGRESS NOTES
Colposcopy    Date of procedure:  10/19/2017   Risks and benefits discussed? yes   All questions answered? yes   Consents given by: patient   Written consent obtained? yes   Pre-op indication: LGSIL          Procedure documentation:  The cervix was initially viewed colposcopically through a green filter.  The cervix was next bathed in acetic acid.   The findings were as follows:    Transformation zone seen? adequate   Findings: 1. Acetowhite noted at 5 o'clock, 6 o'clock and 7 o'clock   Ectocervical biopsies: not obtained.   Endocervical curettage: not performed               Colposcopic Impression: 1. LGSIL  2. Adequate colposcopy  3. Colposcopic findings are consistent with PAP       Plan: follow-up in 6 month(s) for PAP         This note was electronically signed.    Johny Barakat M.D.  October 19, 2017

## 2017-11-06 ENCOUNTER — OFFICE VISIT (OUTPATIENT)
Dept: FAMILY MEDICINE CLINIC | Facility: CLINIC | Age: 27
End: 2017-11-06

## 2017-11-06 VITALS
HEIGHT: 69 IN | RESPIRATION RATE: 14 BRPM | SYSTOLIC BLOOD PRESSURE: 118 MMHG | HEART RATE: 90 BPM | TEMPERATURE: 97.5 F | OXYGEN SATURATION: 99 % | WEIGHT: 228 LBS | BODY MASS INDEX: 33.77 KG/M2 | DIASTOLIC BLOOD PRESSURE: 80 MMHG

## 2017-11-06 DIAGNOSIS — E66.9 OBESITY (BMI 30-39.9): ICD-10-CM

## 2017-11-06 DIAGNOSIS — J01.00 ACUTE MAXILLARY SINUSITIS, RECURRENCE NOT SPECIFIED: Primary | ICD-10-CM

## 2017-11-06 PROCEDURE — 99214 OFFICE O/P EST MOD 30 MIN: CPT | Performed by: PHYSICIAN ASSISTANT

## 2017-11-06 RX ORDER — SULFAMETHOXAZOLE AND TRIMETHOPRIM 800; 160 MG/1; MG/1
1 TABLET ORAL 2 TIMES DAILY
Qty: 20 TABLET | Refills: 0 | Status: SHIPPED | OUTPATIENT
Start: 2017-11-06 | End: 2018-01-02

## 2017-11-06 NOTE — PROGRESS NOTES
Subjective   Brenton Vila is a 27 y.o. female    History of Present Illness  Patient comes in today for follow-up on weight loss management in association with obesity.  Weight is up 1 pound patient states she is on her menstrual cycle and feels that she is retaining fluid.  She continues to find significant reduction in appetite on phentermine and doing well on this and she would like refills.  The following portions of the patient's history were reviewed and updated as appropriate: allergies, current medications, past social history and problem list    Review of Systems   Constitutional: Positive for activity change and appetite change. Negative for chills, fatigue, fever and unexpected weight change.   HENT: Positive for congestion, postnasal drip, rhinorrhea and sinus pressure. Negative for sore throat.    Respiratory: Negative for shortness of breath.    Cardiovascular: Negative for chest pain.   Gastrointestinal: Negative for abdominal distention, abdominal pain, diarrhea and nausea.   Neurological: Positive for headaches. Negative for dizziness.   Hematological: Negative for adenopathy.   Psychiatric/Behavioral: Negative for dysphoric mood. The patient is not nervous/anxious.        Objective     Vitals:    11/06/17 0807   BP: 118/80   Pulse: 90   Resp: 14   Temp: 97.5 °F (36.4 °C)   SpO2: 99%       Physical Exam   Constitutional: She appears well-developed and well-nourished.   Obesity noted     HENT:   Head: Normocephalic and atraumatic.   Right Ear: Tympanic membrane and ear canal normal.   Left Ear: Tympanic membrane and ear canal normal.   Nose: Mucosal edema, rhinorrhea and sinus tenderness present. Right sinus exhibits maxillary sinus tenderness and frontal sinus tenderness. Left sinus exhibits maxillary sinus tenderness and frontal sinus tenderness.   Mouth/Throat: Oropharynx is clear and moist. No oropharyngeal exudate.   Eyes: Pupils are equal, round, and reactive to light.   Neck: No thyromegaly  present.   Cardiovascular: Normal rate and regular rhythm.    Pulmonary/Chest: Effort normal and breath sounds normal.   Abdominal: Soft. There is no tenderness.   Psychiatric: She has a normal mood and affect. Her behavior is normal. Judgment and thought content normal.   Nursing note and vitals reviewed.      Assessment/Plan     Diagnoses and all orders for this visit:    Acute maxillary sinusitis, recurrence not specified    Obesity (BMI 30-39.9)    Other orders  -     sulfamethoxazole-trimethoprim (BACTRIM DS,SEPTRA DS) 800-160 MG per tablet; Take 1 tablet by mouth 2 (Two) Times a Day.    Refilled phentermine 37.5 mg 1 daily #30 with no refills.  Counseled patient on importance of following a low-carb low-calorie diet to the holidays, follow-up in one month for recheck of weight.  Encouraged smoking cessation.

## 2017-11-12 DIAGNOSIS — R60.9 EDEMA, UNSPECIFIED TYPE: ICD-10-CM

## 2017-11-17 RX ORDER — SPIRONOLACTONE 50 MG/1
TABLET, FILM COATED ORAL
Qty: 30 TABLET | Refills: 1 | Status: SHIPPED | OUTPATIENT
Start: 2017-11-17 | End: 2018-01-02

## 2017-12-07 ENCOUNTER — CLINICAL SUPPORT (OUTPATIENT)
Dept: RETAIL CLINIC | Facility: CLINIC | Age: 27
End: 2017-12-07

## 2017-12-07 DIAGNOSIS — Z11.1 VISIT FOR TB SKIN TEST: Primary | ICD-10-CM

## 2017-12-07 PROCEDURE — 86580 TB INTRADERMAL TEST: CPT | Performed by: NURSE PRACTITIONER

## 2017-12-07 NOTE — PROGRESS NOTES
CC:Presents for Tb screening.     S: Has never had a positive test for Tb or been infected with Tb.  Denies symptoms of active Tb and risk factors for acquiring latent or active Tb:  Has not had a cough> 3 weeks, hemoptysis, unexplained fever, unexplained weight loss, fatigue, night sweats, or change in appetite.  s not a high risk contact of person known or suspected of having Tb.  Has not been to another country for 3 or more months where Tb is common.  Has been in the [place] for > 5 years  Is not a resident or employee of high Tb risk congregate setting.  Is not a health care worker who serves high-risk patients.  Is not medically underserved.  Has not been homeless in past 2 years.  Does not inject illicit drugs or use crack cocaine.  Is not HIV positive, or considered at risk for HIV if status is unknown.   Is not imunosuppressed or on immunosuppressive therapy.  Is not malnourished or >10% below ideal body weight.    O: Appears well today. Respirations are even & unlabored. Lungs are CTA bilaterally.    A: TST or BAMT are  Indicated due to job requirements.    P: See scanned copy.     Libia Davis, APRN

## 2018-01-02 ENCOUNTER — OFFICE VISIT (OUTPATIENT)
Dept: FAMILY MEDICINE CLINIC | Facility: CLINIC | Age: 28
End: 2018-01-02

## 2018-01-02 VITALS
OXYGEN SATURATION: 99 % | SYSTOLIC BLOOD PRESSURE: 134 MMHG | TEMPERATURE: 98.3 F | BODY MASS INDEX: 35.84 KG/M2 | DIASTOLIC BLOOD PRESSURE: 86 MMHG | HEIGHT: 69 IN | HEART RATE: 82 BPM | WEIGHT: 242 LBS

## 2018-01-02 DIAGNOSIS — J32.9 SINUSITIS, UNSPECIFIED CHRONICITY, UNSPECIFIED LOCATION: ICD-10-CM

## 2018-01-02 DIAGNOSIS — R51.9 ACUTE INTRACTABLE HEADACHE, UNSPECIFIED HEADACHE TYPE: Primary | ICD-10-CM

## 2018-01-02 PROCEDURE — 96372 THER/PROPH/DIAG INJ SC/IM: CPT | Performed by: PHYSICIAN ASSISTANT

## 2018-01-02 PROCEDURE — 99213 OFFICE O/P EST LOW 20 MIN: CPT | Performed by: PHYSICIAN ASSISTANT

## 2018-01-02 RX ORDER — KETOROLAC TROMETHAMINE 30 MG/ML
60 INJECTION, SOLUTION INTRAMUSCULAR; INTRAVENOUS ONCE
Status: COMPLETED | OUTPATIENT
Start: 2018-01-02 | End: 2018-01-02

## 2018-01-02 RX ORDER — CEFDINIR 300 MG/1
CAPSULE ORAL
Qty: 14 CAPSULE | Refills: 0 | Status: SHIPPED | OUTPATIENT
Start: 2018-01-02 | End: 2018-01-09

## 2018-01-02 RX ORDER — METHYLPREDNISOLONE ACETATE 40 MG/ML
40 INJECTION, SUSPENSION INTRA-ARTICULAR; INTRALESIONAL; INTRAMUSCULAR; SOFT TISSUE ONCE
Status: COMPLETED | OUTPATIENT
Start: 2018-01-02 | End: 2018-01-02

## 2018-01-02 RX ADMIN — METHYLPREDNISOLONE ACETATE 40 MG: 40 INJECTION, SUSPENSION INTRA-ARTICULAR; INTRALESIONAL; INTRAMUSCULAR; SOFT TISSUE at 09:55

## 2018-01-02 RX ADMIN — KETOROLAC TROMETHAMINE 60 MG: 30 INJECTION, SOLUTION INTRAMUSCULAR; INTRAVENOUS at 09:49

## 2018-01-02 NOTE — PROGRESS NOTES
Subjective   Brenton Vila is a 27 y.o. female    History of Present Illness  Patient comes in today for evaluation of headache.  She states that she started feeling bad Sunday morning woke up with some tenderness around her right jawline at her neck feeling 2 knots.  She states she went to the urgent care and was seen and told she had lymph nodes there.  She started developing a headache yesterday over her left eye.  She states her head is pounding this morning.  She's tried over-the-counter medication without any relief.  She does not have a history of migraines but wonders if this is a migraine.  Chest some light sensitivity in her left eye.  No history of head trauma recently.  No fever.  No sore throat, no cough and no body aches.  She is not nauseated, no vomiting.  She has had some nasal congestion.  No dental problems that she is aware of.    The following portions of the patient's history were reviewed and updated as appropriate: allergies, current medications, past social history and problem list    Review of Systems   Constitutional: Negative for fatigue and unexpected weight change.   HENT: Positive for congestion. Negative for dental problem, postnasal drip, sinus pressure and sore throat.    Eyes: Negative for photophobia, pain and visual disturbance.   Gastrointestinal: Negative for nausea and vomiting.   Neurological: Positive for headaches. Negative for dizziness, syncope, facial asymmetry, speech difficulty, weakness, light-headedness and numbness.   Hematological: Positive for adenopathy.   Psychiatric/Behavioral: Negative for agitation, confusion, dysphoric mood and sleep disturbance. The patient is not nervous/anxious.        Objective     Vitals:    01/02/18 0839   BP: 134/86   Pulse: 82   Temp: 98.3 °F (36.8 °C)   SpO2: 99%       Physical Exam   Constitutional: She is oriented to person, place, and time. She appears well-developed and well-nourished.  Non-toxic appearance.   HENT:   Head:  Normocephalic and atraumatic.   Nose: Mucosal edema present. Left sinus exhibits maxillary sinus tenderness and frontal sinus tenderness.   Eyes: Conjunctivae and EOM are normal. Pupils are equal, round, and reactive to light.   Neck: Normal range of motion and full passive range of motion without pain. Neck supple. No muscular tenderness present. No rigidity. Normal range of motion present.   Cardiovascular: Normal rate and regular rhythm.    Pulmonary/Chest: Effort normal.   Lymphadenopathy:     She has cervical adenopathy.   Neurological: She is alert and oriented to person, place, and time. No cranial nerve deficit or sensory deficit.   Psychiatric: She has a normal mood and affect. Her speech is normal and behavior is normal. Judgment and thought content normal. Cognition and memory are normal.   Nursing note and vitals reviewed.      Assessment/Plan     Diagnoses and all orders for this visit:    Acute intractable headache, unspecified headache type  -     ketorolac (TORADOL) injection 60 mg; Inject 60 mg into the shoulder, thigh, or buttocks 1 (One) Time.  -     methylPREDNISolone acetate (DEPO-medrol) injection 40 mg; Inject 1 mL into the shoulder, thigh, or buttocks 1 (One) Time.    Sinusitis, unspecified chronicity, unspecified location  -     cefdinir (OMNICEF) 300 MG capsule; Take 2 daily for sinusitis     prescription written for Percocet 5/325 one every 6 hours as needed for persistent headache dispensed #10 with no refills, discussed abuse potential this medication in for short-term usage only, patient will follow-up in office for recheck and reevaluation if unimproved after 48 hours.  She will go to ER if symptoms worsen acutely.  Andrew reviewed, appropriate.

## 2018-01-03 ENCOUNTER — TELEPHONE (OUTPATIENT)
Dept: FAMILY MEDICINE CLINIC | Facility: CLINIC | Age: 28
End: 2018-01-03

## 2018-01-03 NOTE — TELEPHONE ENCOUNTER
----- Message from Alexander Mancilla sent at 1/3/2018 11:23 AM EST -----  Contact: PATIENT  PATIENT CALLED REGARDING HER NEW SYMPTOM OF SHORTNESS OF BREATH. PATIENT WOULD LIKE A CALL BACK TO CONSULT. A GOOD CALL BACK NUMBER [phone]. THANK YOU.

## 2018-01-03 NOTE — TELEPHONE ENCOUNTER
Spoke to patient, she states she feels better and thinks it may have been a panic attack. Notified to seek medical attention if symptoms presist and worsen.

## 2018-01-08 ENCOUNTER — HOSPITAL ENCOUNTER (EMERGENCY)
Facility: HOSPITAL | Age: 28
Discharge: HOME | End: 2018-01-08
Attending: EMERGENCY MEDICINE | Admitting: EMERGENCY MEDICINE

## 2018-01-08 ENCOUNTER — APPOINTMENT (OUTPATIENT)
Dept: GENERAL RADIOLOGY | Facility: HOSPITAL | Age: 28
End: 2018-01-08

## 2018-01-08 VITALS
BODY MASS INDEX: 34.07 KG/M2 | SYSTOLIC BLOOD PRESSURE: 137 MMHG | HEART RATE: 109 BPM | HEIGHT: 69 IN | DIASTOLIC BLOOD PRESSURE: 100 MMHG | WEIGHT: 230 LBS | OXYGEN SATURATION: 100 % | RESPIRATION RATE: 18 BRPM | TEMPERATURE: 98.1 F

## 2018-01-08 DIAGNOSIS — R00.2 PALPITATION: Primary | ICD-10-CM

## 2018-01-08 DIAGNOSIS — R06.00 DYSPNEA, UNSPECIFIED TYPE: ICD-10-CM

## 2018-01-08 LAB
ALBUMIN SERPL-MCNC: 4.5 G/DL (ref 3.2–4.8)
ALBUMIN/GLOB SERPL: 1.6 G/DL (ref 1.5–2.5)
ALP SERPL-CCNC: 73 U/L (ref 25–100)
ALT SERPL W P-5'-P-CCNC: 26 U/L (ref 7–40)
ANION GAP SERPL CALCULATED.3IONS-SCNC: 4 MMOL/L (ref 3–11)
AST SERPL-CCNC: 24 U/L (ref 0–33)
B-HCG UR QL: NEGATIVE
BASOPHILS # BLD AUTO: 0.02 10*3/MM3 (ref 0–0.2)
BASOPHILS NFR BLD AUTO: 0.2 % (ref 0–1)
BILIRUB SERPL-MCNC: 0.2 MG/DL (ref 0.3–1.2)
BUN BLD-MCNC: 12 MG/DL (ref 9–23)
BUN/CREAT SERPL: 17.1 (ref 7–25)
CALCIUM SPEC-SCNC: 9.1 MG/DL (ref 8.7–10.4)
CHLORIDE SERPL-SCNC: 110 MMOL/L (ref 99–109)
CO2 SERPL-SCNC: 23 MMOL/L (ref 20–31)
CREAT BLD-MCNC: 0.7 MG/DL (ref 0.6–1.3)
D DIMER PPP FEU-MCNC: 0.25 MG/L (FEU) (ref 0–0.5)
DEPRECATED RDW RBC AUTO: 46.8 FL (ref 37–54)
EOSINOPHIL # BLD AUTO: 0.32 10*3/MM3 (ref 0–0.3)
EOSINOPHIL NFR BLD AUTO: 3.2 % (ref 0–3)
ERYTHROCYTE [DISTWIDTH] IN BLOOD BY AUTOMATED COUNT: 13.3 % (ref 11.3–14.5)
GFR SERPL CREATININE-BSD FRML MDRD: 101 ML/MIN/1.73
GLOBULIN UR ELPH-MCNC: 2.8 GM/DL
GLUCOSE BLD-MCNC: 91 MG/DL (ref 70–100)
HCT VFR BLD AUTO: 42.7 % (ref 34.5–44)
HGB BLD-MCNC: 14.2 G/DL (ref 11.5–15.5)
IMM GRANULOCYTES # BLD: 0.06 10*3/MM3 (ref 0–0.03)
IMM GRANULOCYTES NFR BLD: 0.6 % (ref 0–0.6)
INTERNAL NEGATIVE CONTROL: NEGATIVE
INTERNAL POSITIVE CONTROL: POSITIVE
LYMPHOCYTES # BLD AUTO: 3.39 10*3/MM3 (ref 0.6–4.8)
LYMPHOCYTES NFR BLD AUTO: 34.1 % (ref 24–44)
Lab: NORMAL
MCH RBC QN AUTO: 32.1 PG (ref 27–31)
MCHC RBC AUTO-ENTMCNC: 33.3 G/DL (ref 32–36)
MCV RBC AUTO: 96.4 FL (ref 80–99)
MONOCYTES # BLD AUTO: 0.75 10*3/MM3 (ref 0–1)
MONOCYTES NFR BLD AUTO: 7.5 % (ref 0–12)
NEUTROPHILS # BLD AUTO: 5.4 10*3/MM3 (ref 1.5–8.3)
NEUTROPHILS NFR BLD AUTO: 54.4 % (ref 41–71)
PLATELET # BLD AUTO: 304 10*3/MM3 (ref 150–450)
PMV BLD AUTO: 9.9 FL (ref 6–12)
POTASSIUM BLD-SCNC: 4.4 MMOL/L (ref 3.5–5.5)
PROT SERPL-MCNC: 7.3 G/DL (ref 5.7–8.2)
RBC # BLD AUTO: 4.43 10*6/MM3 (ref 3.89–5.14)
SODIUM BLD-SCNC: 137 MMOL/L (ref 132–146)
TROPONIN I SERPL-MCNC: 0.01 NG/ML (ref 0–0.07)
WBC NRBC COR # BLD: 9.94 10*3/MM3 (ref 3.5–10.8)

## 2018-01-08 PROCEDURE — 85379 FIBRIN DEGRADATION QUANT: CPT | Performed by: PHYSICIAN ASSISTANT

## 2018-01-08 PROCEDURE — 93005 ELECTROCARDIOGRAM TRACING: CPT | Performed by: EMERGENCY MEDICINE

## 2018-01-08 PROCEDURE — 71045 X-RAY EXAM CHEST 1 VIEW: CPT

## 2018-01-08 PROCEDURE — 85025 COMPLETE CBC W/AUTO DIFF WBC: CPT | Performed by: PHYSICIAN ASSISTANT

## 2018-01-08 PROCEDURE — 80053 COMPREHEN METABOLIC PANEL: CPT | Performed by: PHYSICIAN ASSISTANT

## 2018-01-08 PROCEDURE — 84484 ASSAY OF TROPONIN QUANT: CPT

## 2018-01-08 PROCEDURE — 99283 EMERGENCY DEPT VISIT LOW MDM: CPT

## 2018-01-08 PROCEDURE — 36415 COLL VENOUS BLD VENIPUNCTURE: CPT

## 2018-01-08 NOTE — ED PROVIDER NOTES
Subjective   HPI Comments: Pt is a 28 yo female presenting to ED with SOB and palpitations. She describes episodes of heart racing that will last 30-60 minutes intermittent for the past week. She states the episodes occurred while driving, lying in bed and at [employer]. The palpitations and SOB improve with standing up and walking around. She denies dizziness, syncope, chest pain, cough, fever, chills, or abdominal pain. She denies anxiety or panic attacks during episodes. She denies swelling to legs, hormone use, recent travel, hx of PE / DVT but does use tobacco. Only new medication includes Omnicef that she took for sinus infections but states she was having SOB and palpitations before taking the antibiotic.     Patient is a 27 y.o. female presenting with palpitations.   History provided by:  Patient  Palpitations   Palpitations quality:  Irregular  Duration:  1 week  Timing:  Intermittent  Relieved by: Standing up.  Ineffective treatments:  None tried  Associated symptoms: shortness of breath    Associated symptoms: no back pain, no chest pain, no cough, no dizziness, no leg pain, no lower extremity edema, no malaise/fatigue, no nausea, no near-syncope, no numbness, no syncope, no vomiting and no weakness    Risk factors: no diabetes mellitus, no heart disease, no hx of atrial fibrillation, no hx of DVT, no hx of PE, no hx of thyroid disease and no stress        Review of Systems   Constitutional: Negative for chills, fever and malaise/fatigue.   HENT: Negative for congestion, ear pain, sore throat and trouble swallowing.    Eyes: Negative for pain, redness and visual disturbance.   Respiratory: Positive for shortness of breath. Negative for cough and chest tightness.    Cardiovascular: Positive for palpitations. Negative for chest pain, leg swelling, syncope and near-syncope.   Gastrointestinal: Negative for abdominal pain, constipation, diarrhea, nausea and vomiting.   Genitourinary: Negative for difficulty  urinating, dysuria, flank pain, hematuria and vaginal bleeding.   Musculoskeletal: Negative for arthralgias, back pain and joint swelling.   Skin: Negative for rash and wound.   Neurological: Negative for dizziness, syncope, speech difficulty, weakness, numbness and headaches.   Psychiatric/Behavioral: Negative for confusion.   All other systems reviewed and are negative.      Past Medical History:   Diagnosis Date   • History of PCOS 01/2015   • Hyperlipidemia    • Hypothyroid 02/2017       No Known Allergies    Past Surgical History:   Procedure Laterality Date   • ENDOSCOPY  06/2016    Removal bile duct stone   • GALLBLADDER SURGERY     • LAPAROSCOPIC CHOLECYSTECTOMY  04/2016       History reviewed. No pertinent family history.    Social History     Social History   • Marital status:      Spouse name: N/A   • Number of children: N/A   • Years of education: N/A     Social History Main Topics   • Smoking status: Current Some Day Smoker     Packs/day: 0.50     Types: Cigarettes     Start date: 2007     Last attempt to quit: 7/27/2017   • Smokeless tobacco: Never Used   • Alcohol use No   • Drug use: No   • Sexual activity: Yes     Partners: Male     Other Topics Concern   • None     Social History Narrative           Objective   Physical Exam   Constitutional: She is oriented to person, place, and time. Vital signs are normal. She appears well-developed.   HENT:   Head: Atraumatic.   Nose: Nose normal.   Mouth/Throat: Mucous membranes are normal.   Eyes: Conjunctivae, EOM and lids are normal. Pupils are equal, round, and reactive to light.   Neck: Normal range of motion. Neck supple.   Cardiovascular: Regular rhythm and normal heart sounds.  Tachycardia present.    Pulmonary/Chest: Effort normal and breath sounds normal. She has no wheezes.   Abdominal: Soft. She exhibits no distension. There is no tenderness. There is no rebound and no guarding.   Musculoskeletal: Normal range of motion. She exhibits no  edema or tenderness.   Neurological: She is alert and oriented to person, place, and time. No sensory deficit.   Skin: Skin is warm and dry. No rash noted. No erythema.   Psychiatric: She has a normal mood and affect. Her speech is normal and behavior is normal.   Nursing note and vitals reviewed.      Procedures         ED Course  ED Course      Re-examined patient several times in ED. Pt resting comfortably, no distress, no current palpitations or SOB in ED. Discussed results and discharge. She is agreeable and will f/u with PCP. She will return to ED if sx worsen.      Recent Results (from the past 24 hour(s))   POCT Pregnancy, Urine    Collection Time: 01/08/18  8:49 AM   Result Value Ref Range    HCG, Urine, QL Negative Negative    Lot Number [id number]     Internal Positive Control Positive     Internal Negative Control Negative    Comprehensive Metabolic Panel    Collection Time: 01/08/18  8:55 AM   Result Value Ref Range    Glucose 91 70 - 100 mg/dL    BUN 12 9 - 23 mg/dL    Creatinine 0.70 0.60 - 1.30 mg/dL    Sodium 137 132 - 146 mmol/L    Potassium 4.4 3.5 - 5.5 mmol/L    Chloride 110 (H) 99 - 109 mmol/L    CO2 23.0 20.0 - 31.0 mmol/L    Calcium 9.1 8.7 - 10.4 mg/dL    Total Protein 7.3 5.7 - 8.2 g/dL    Albumin 4.50 3.20 - 4.80 g/dL    ALT (SGPT) 26 7 - 40 U/L    AST (SGOT) 24 0 - 33 U/L    Alkaline Phosphatase 73 25 - 100 U/L    Total Bilirubin 0.2 (L) 0.3 - 1.2 mg/dL    eGFR Non African Amer 101 >60 mL/min/1.73    Globulin 2.8 gm/dL    A/G Ratio 1.6 1.5 - 2.5 g/dL    BUN/Creatinine Ratio 17.1 7.0 - 25.0    Anion Gap 4.0 3.0 - 11.0 mmol/L   D-dimer, Quantitative    Collection Time: 01/08/18  8:55 AM   Result Value Ref Range    D-Dimer, Quantitative 0.25 0.00 - 0.50 mg/L (FEU)   CBC Auto Differential    Collection Time: 01/08/18  8:55 AM   Result Value Ref Range    WBC 9.94 3.50 - 10.80 10*3/mm3    RBC 4.43 3.89 - 5.14 10*6/mm3    Hemoglobin 14.2 11.5 - 15.5 g/dL    Hematocrit 42.7 34.5 - 44.0 %    MCV 96.4  "80.0 - 99.0 fL    MCH 32.1 (H) 27.0 - 31.0 pg    MCHC 33.3 32.0 - 36.0 g/dL    RDW 13.3 11.3 - 14.5 %    RDW-SD 46.8 37.0 - 54.0 fl    MPV 9.9 6.0 - 12.0 fL    Platelets 304 150 - 450 10*3/mm3    Neutrophil % 54.4 41.0 - 71.0 %    Lymphocyte % 34.1 24.0 - 44.0 %    Monocyte % 7.5 0.0 - 12.0 %    Eosinophil % 3.2 (H) 0.0 - 3.0 %    Basophil % 0.2 0.0 - 1.0 %    Immature Grans % 0.6 0.0 - 0.6 %    Neutrophils, Absolute 5.40 1.50 - 8.30 10*3/mm3    Lymphocytes, Absolute 3.39 0.60 - 4.80 10*3/mm3    Monocytes, Absolute 0.75 0.00 - 1.00 10*3/mm3    Eosinophils, Absolute 0.32 (H) 0.00 - 0.30 10*3/mm3    Basophils, Absolute 0.02 0.00 - 0.20 10*3/mm3    Immature Grans, Absolute 0.06 (H) 0.00 - 0.03 10*3/mm3   POC Troponin, Rapid    Collection Time: 01/08/18  9:03 AM   Result Value Ref Range    Troponin I 0.01 0.00 - 0.07 ng/mL     Note: In addition to lab results from this visit, the labs listed above may include labs taken at another facility or during a different encounter within the last 24 hours. Please correlate lab times with ED admission and discharge times for further clarification of the services performed during this visit.    XR Chest 1 View   Final Result   No acute cardiopulmonary disease.       D:  01/08/2018   E:  01/08/2018       This report was finalized on 1/8/2018 12:56 PM by Dr. Loree Kohli MD.            Vitals:    01/08/18 0744 01/08/18 0843 01/08/18 0844 01/08/18 0845   BP: 144/95 130/79 130/96 137/100   Patient Position: Lying Lying Sitting Standing   Pulse: 103 95 100 109   Resp: 18      Temp: 98.1 °F (36.7 °C)      TempSrc: Oral      SpO2: 100%      Weight: 104 kg (230 lb)      Height: 175.3 cm (69\")        Medications - No data to display  ECG/EMG Results (last 24 hours)     ** No results found for the last 24 hours. **                    [facility]    Final diagnoses:   Palpitation   Dyspnea, unspecified type            JOSE Griffith  01/08/18 1712    "

## 2018-01-09 ENCOUNTER — OFFICE VISIT (OUTPATIENT)
Dept: FAMILY MEDICINE CLINIC | Facility: CLINIC | Age: 28
End: 2018-01-09

## 2018-01-09 VITALS
HEART RATE: 100 BPM | TEMPERATURE: 98.4 F | SYSTOLIC BLOOD PRESSURE: 134 MMHG | HEIGHT: 69 IN | BODY MASS INDEX: 35.84 KG/M2 | WEIGHT: 242 LBS | DIASTOLIC BLOOD PRESSURE: 80 MMHG | OXYGEN SATURATION: 98 %

## 2018-01-09 DIAGNOSIS — R00.2 PALPITATIONS: Primary | ICD-10-CM

## 2018-01-09 DIAGNOSIS — G43.919 INTRACTABLE MIGRAINE WITHOUT STATUS MIGRAINOSUS, UNSPECIFIED MIGRAINE TYPE: ICD-10-CM

## 2018-01-09 DIAGNOSIS — R03.0 ELEVATED BLOOD PRESSURE READING WITHOUT DIAGNOSIS OF HYPERTENSION: ICD-10-CM

## 2018-01-09 DIAGNOSIS — R00.2 HEART PALPITATIONS: Primary | ICD-10-CM

## 2018-01-09 PROCEDURE — 99214 OFFICE O/P EST MOD 30 MIN: CPT | Performed by: PHYSICIAN ASSISTANT

## 2018-01-09 PROCEDURE — 96372 THER/PROPH/DIAG INJ SC/IM: CPT | Performed by: PHYSICIAN ASSISTANT

## 2018-01-09 RX ORDER — KETOROLAC TROMETHAMINE 30 MG/ML
60 INJECTION, SOLUTION INTRAMUSCULAR; INTRAVENOUS EVERY 6 HOURS PRN
Status: COMPLETED | OUTPATIENT
Start: 2018-01-09 | End: 2018-01-09

## 2018-01-09 RX ADMIN — KETOROLAC TROMETHAMINE 60 MG: 30 INJECTION, SOLUTION INTRAMUSCULAR; INTRAVENOUS at 09:54

## 2018-01-09 NOTE — PROGRESS NOTES
Subjective   Brenton Vila is a 27 y.o. female    History of Present Illness  Patient comes in today for 2 separate issues.  She is here for follow-up from emergency department yesterday where she presented with her palpitations.  She states she's been expressing his for the past 2 weeks.  She does not consume caffeine, is no longer on phentermine is not taking any stimulants.  She states that she has expressed about 6 episodes may happen always when she is at rest.  She states her heart starts racing very rapidly and then slows down.  Her blood pressures fluctuated up and down with this as well.  She has no history of hypertension in the past.  Second issue is of a headache for the past 2 days.  She states she has a painful headache that she awakened with just a morning and has not gone away yet it is over her left eye and the left side of her face with some nasal irritation on the left side.  She has light sensitivity.  She states she's been getting these more for the past month.  She has not taken anything other than ibuprofen for it which did not have any effect.  No nausea or vomiting.  No history of head trauma.  She denies excessive stress or any new stressors at this time.  The following portions of the patient's history were reviewed and updated as appropriate: allergies, current medications, past social history and problem list    Review of Systems   Constitutional: Negative for fatigue and unexpected weight change.   HENT: Negative for congestion, dental problem, postnasal drip, sinus pressure and sore throat.    Eyes: Positive for photophobia. Negative for pain and visual disturbance.   Respiratory: Negative for cough, chest tightness and shortness of breath.    Cardiovascular: Positive for palpitations. Negative for chest pain and leg swelling.   Gastrointestinal: Negative for nausea and vomiting.   Skin: Negative for color change and rash.   Neurological: Positive for headaches. Negative for dizziness,  syncope, facial asymmetry, speech difficulty, weakness, light-headedness and numbness.   Psychiatric/Behavioral: Negative for agitation, confusion, dysphoric mood and sleep disturbance. The patient is not nervous/anxious.        Objective     Vitals:    01/09/18 0806   BP: 134/80   Pulse: 100   Temp: 98.4 °F (36.9 °C)   SpO2: 98%       Physical Exam   Constitutional: She is oriented to person, place, and time. She appears well-developed and well-nourished. No distress.   HENT:   Head: Normocephalic and atraumatic.   Eyes: Conjunctivae and EOM are normal. Pupils are equal, round, and reactive to light.   Neck: Normal range of motion. Neck supple. No JVD present.   Cardiovascular: Normal rate, regular rhythm, normal heart sounds and intact distal pulses.    No murmur heard.  Pulmonary/Chest: Effort normal and breath sounds normal. No respiratory distress. She exhibits no tenderness.   Abdominal: Soft. She exhibits no distension. There is no tenderness.   Musculoskeletal: She exhibits no edema.   Neurological: She is alert and oriented to person, place, and time. No cranial nerve deficit or sensory deficit.   Skin: Skin is warm and dry. She is not diaphoretic. No erythema. No pallor.   Psychiatric: She has a normal mood and affect. Her speech is normal and behavior is normal. Judgment and thought content normal. Cognition and memory are normal.   Nursing note and vitals reviewed.    I've reviewed her ER notes from yesterday including normal EKG, essentially normal labs and normal chest x-ray with patient.  Reviewed recent TSH which was normal as well.  Assessment/Plan     Diagnoses and all orders for this visit:    Heart palpitations  -     Holter Monitor - 24 Hour    Intractable migraine without status migrainosus, unspecified migraine type  -     ketorolac (TORADOL) injection 60 mg; Inject 60 mg into the shoulder, thigh, or buttocks Every 6 (Six) Hours As Needed for Moderate Pain  (one dose today only).    Elevated  blood pressure reading without diagnosis of hypertension    Will follow-up with patient after results of Holter 24 monitor received.

## 2018-02-15 ENCOUNTER — OFFICE VISIT (OUTPATIENT)
Dept: FAMILY MEDICINE CLINIC | Facility: CLINIC | Age: 28
End: 2018-02-15

## 2018-02-15 VITALS
OXYGEN SATURATION: 98 % | TEMPERATURE: 98.1 F | SYSTOLIC BLOOD PRESSURE: 128 MMHG | BODY MASS INDEX: 36.76 KG/M2 | WEIGHT: 248.2 LBS | DIASTOLIC BLOOD PRESSURE: 84 MMHG | HEART RATE: 95 BPM | HEIGHT: 69 IN

## 2018-02-15 DIAGNOSIS — S39.012A STRAIN OF LUMBAR REGION, INITIAL ENCOUNTER: ICD-10-CM

## 2018-02-15 DIAGNOSIS — E66.9 OBESITY (BMI 30-39.9): Primary | ICD-10-CM

## 2018-02-15 PROCEDURE — 99214 OFFICE O/P EST MOD 30 MIN: CPT | Performed by: PHYSICIAN ASSISTANT

## 2018-02-15 RX ORDER — LEVOTHYROXINE SODIUM 0.03 MG/1
25 TABLET ORAL ONCE
COMMUNITY
Start: 2018-01-23 | End: 2018-08-09 | Stop reason: SDUPTHER

## 2018-02-15 RX ORDER — METHOCARBAMOL 500 MG/1
500 TABLET, FILM COATED ORAL 4 TIMES DAILY
Qty: 40 TABLET | Refills: 1 | Status: SHIPPED | OUTPATIENT
Start: 2018-02-15 | End: 2018-03-28

## 2018-02-15 NOTE — PROGRESS NOTES
Subjective   Brenton Vila is a 28 y.o. female  Weight Check (Follow up on weight) and Med Refill (requests Phentermine Refill)      History of Present Illness     Patient comes in today for 2 separate issues one is a chronic medical problem is uncontrolled and the second is a new problem.  She's here regarding her obesity.  She states she stopped her phentermine several months ago because she reached her weight goals and was doing well following a low calorie high-protein low-carb diet and didn't think she needed it.  However she is been very disheartened to see her weight continued increased despite following a diet.  She would like to resume her phentermine as her weight is increased significantly back into a level of obesity.  Her second issue is regarding back pain.  She states is worse when she sits down when she drives to work she has to have a special seat cushion because she has pain because her low back into both legs in her both thighs posteriorly shoots to her knees.  No weakness or numbness in her legs.  States she's fine when she stands up it always comes back when she sits down.  With recent visit patient was seen for palpitations she feels that was due to the steroid she took she has had no further palpitations and her heart monitor was normal.  The following portions of the patient's history were reviewed and updated as appropriate: allergies, current medications, past social history and problem list    Review of Systems   Constitutional: Positive for activity change, appetite change and unexpected weight change.   Respiratory: Negative.    Cardiovascular: Negative for chest pain.   Gastrointestinal: Negative.  Negative for abdominal distention, abdominal pain, diarrhea and nausea.   Genitourinary: Negative.    Musculoskeletal: Positive for back pain. Negative for arthralgias, gait problem and myalgias.   Neurological: Negative for dizziness, tremors, weakness and numbness.   Psychiatric/Behavioral:  Negative for dysphoric mood. The patient is not nervous/anxious.        Objective     Vitals:    02/15/18 0819   BP: 128/84   Pulse: 95   Temp: 98.1 °F (36.7 °C)   SpO2: 98%       Physical Exam   Constitutional: She is oriented to person, place, and time. She appears well-developed and well-nourished.   Obesity noted     Neck: No thyromegaly present.   Cardiovascular: Normal rate and regular rhythm.    Pulmonary/Chest: Effort normal and breath sounds normal.   Abdominal: Soft. Bowel sounds are normal. There is no tenderness.   Musculoskeletal:        Lumbar back: She exhibits decreased range of motion, tenderness, bony tenderness and pain. She exhibits no swelling, no deformity and no spasm.   Neurological: She is alert and oriented to person, place, and time. She has normal reflexes.   Psychiatric: She has a normal mood and affect. Her behavior is normal. Judgment and thought content normal.   Nursing note and vitals reviewed.      Assessment/Plan     Diagnoses and all orders for this visit:    Obesity (BMI 30-39.9)    Strain of lumbar region, initial encounter  -     methocarbamol (ROBAXIN) 500 MG tablet; Take 1 tablet by mouth 4 (Four) Times a Day. For back pain    Other orders  -     levothyroxine (SYNTHROID, LEVOTHROID) 25 MCG tablet; Take 25 mcg by mouth 1 (One) Time.    Refill given on phentermine 37.5 mg tablets one daily #30 with no refills.  Discussed abuse potential this medication.  Patient will continue on her diet that is low carb, high protein and low calorie.  She will follow-up in one month for recheck of weight.  Discussed importance of stretching strengthening exercises for low back for 4 to physical therapy if unimproved after the next week.

## 2018-03-12 ENCOUNTER — OFFICE VISIT (OUTPATIENT)
Dept: FAMILY MEDICINE CLINIC | Facility: CLINIC | Age: 28
End: 2018-03-12

## 2018-03-12 ENCOUNTER — TRANSCRIBE ORDERS (OUTPATIENT)
Dept: FAMILY MEDICINE CLINIC | Facility: CLINIC | Age: 28
End: 2018-03-12

## 2018-03-12 ENCOUNTER — HOSPITAL ENCOUNTER (OUTPATIENT)
Dept: GENERAL RADIOLOGY | Facility: HOSPITAL | Age: 28
Discharge: HOME | End: 2018-03-12
Admitting: PHYSICIAN ASSISTANT

## 2018-03-12 VITALS
BODY MASS INDEX: 36.29 KG/M2 | HEART RATE: 105 BPM | HEIGHT: 69 IN | WEIGHT: 245 LBS | OXYGEN SATURATION: 99 % | DIASTOLIC BLOOD PRESSURE: 76 MMHG | SYSTOLIC BLOOD PRESSURE: 120 MMHG | TEMPERATURE: 98.2 F

## 2018-03-12 DIAGNOSIS — G89.29 CHRONIC LOW BACK PAIN WITH SCIATICA, SCIATICA LATERALITY UNSPECIFIED, UNSPECIFIED BACK PAIN LATERALITY: Primary | ICD-10-CM

## 2018-03-12 DIAGNOSIS — M25.552 PAIN OF BOTH HIP JOINTS: Primary | ICD-10-CM

## 2018-03-12 DIAGNOSIS — M54.40 CHRONIC LOW BACK PAIN WITH SCIATICA, SCIATICA LATERALITY UNSPECIFIED, UNSPECIFIED BACK PAIN LATERALITY: Primary | ICD-10-CM

## 2018-03-12 DIAGNOSIS — M25.551 PAIN OF BOTH HIP JOINTS: Primary | ICD-10-CM

## 2018-03-12 DIAGNOSIS — M54.41 LOW BACK PAIN WITH BILATERAL SCIATICA, UNSPECIFIED BACK PAIN LATERALITY, UNSPECIFIED CHRONICITY: ICD-10-CM

## 2018-03-12 DIAGNOSIS — M54.42 LOW BACK PAIN WITH BILATERAL SCIATICA, UNSPECIFIED BACK PAIN LATERALITY, UNSPECIFIED CHRONICITY: ICD-10-CM

## 2018-03-12 DIAGNOSIS — E66.9 OBESITY (BMI 30-39.9): ICD-10-CM

## 2018-03-12 PROCEDURE — 73521 X-RAY EXAM HIPS BI 2 VIEWS: CPT

## 2018-03-12 PROCEDURE — 99214 OFFICE O/P EST MOD 30 MIN: CPT | Performed by: PHYSICIAN ASSISTANT

## 2018-03-12 RX ORDER — PHENTERMINE HYDROCHLORIDE 37.5 MG/1
37.5 CAPSULE ORAL EVERY MORNING
COMMUNITY
End: 2018-05-10

## 2018-03-12 RX ORDER — TOPIRAMATE 25 MG/1
TABLET ORAL
COMMUNITY
Start: 2018-03-04 | End: 2018-04-17 | Stop reason: SDUPTHER

## 2018-03-12 NOTE — PROGRESS NOTES
Subjective   Brenton Vila is a 28 y.o. female  Weight Check (Follow up on weight, refill on phentermine ) and Shoulder Pain (right shoulder pain, fell this morning )      History of Present Illness  Patient comes in today for 2 separate issues one is for a chronically stable medical problem obesity, she is due for refills of her phentermine, this is doing well on phentermine as she has gotten back on phentermine her weight has started coming back down.  She does feel that it significantly reduce her appetite so she can lower her daily caloric intake.  She is trying to follow a high-protein, low carb diet.  Second problem is acute on chronic.  She has been dealing with low back pain for the past 3 months, see previous office notes.  She states it started one day without any known trauma.  She's had an x-ray of her back that was fairly insignificant, showed only some mild scoliosis.  She states that it bothers her after prolonged sitting, it is bilateral low back region going into both hips.  She states that she expresses severe pain at times in her right thigh.  She states she is expresses twice while sitting down.  It improves if she gets up and walks around.  She is worried there could be something wrong with her hips.  She states she fell on the driveway this morning and caught her self with her shoulder, she says her shoulders fine she can move it without any pain or limitations but her low back pain has worsened.  The following portions of the patient's history were reviewed and updated as appropriate: allergies, current medications, past social history and problem list    Review of Systems   Constitutional: Negative.  Negative for unexpected weight change.   Respiratory: Negative.    Cardiovascular: Negative for chest pain.   Gastrointestinal: Negative.  Negative for abdominal distention, abdominal pain, diarrhea and nausea.   Genitourinary: Negative.    Musculoskeletal: Positive for back pain and myalgias.  Negative for arthralgias and gait problem.   Neurological: Negative for dizziness, tremors, weakness and numbness.   Psychiatric/Behavioral: Negative for dysphoric mood. The patient is not nervous/anxious.        Objective     Vitals:    03/12/18 0849   BP: 120/76   Pulse: 105   Temp: 98.2 °F (36.8 °C)   SpO2: 99%       Physical Exam   Constitutional: She is oriented to person, place, and time. She appears well-developed and well-nourished.   Obesity noted     Neck: No thyromegaly present.   Cardiovascular: Normal rate and regular rhythm.    Pulmonary/Chest: Effort normal and breath sounds normal.   Abdominal: Soft. Bowel sounds are normal. There is no tenderness.   Musculoskeletal:        Lumbar back: She exhibits decreased range of motion, tenderness, bony tenderness and pain. She exhibits no swelling, no deformity and no spasm.   Neurological: She is alert and oriented to person, place, and time. She has normal reflexes.   Psychiatric: She has a normal mood and affect. Her behavior is normal. Judgment and thought content normal.   Nursing note and vitals reviewed.      Assessment/Plan     Diagnoses and all orders for this visit:    Pain of both hip joints  -     Cancel: XR hip 1 vw bilateral  -     XR Hips Bilateral With or Without Pelvis 2 View    Obesity (BMI 30-39.9)    Low back pain with bilateral sciatica, unspecified back pain laterality, unspecified chronicity    Other orders  -     topiramate (TOPAMAX) 25 MG tablet;   -     phentermine 37.5 MG capsule; Take 37.5 mg by mouth Every Morning.    Refilled phentermine 37.5 mg tablets one daily #30 with no refills discussed importance of following low-carb high-protein and low calorie diet and follow-up in one month for weight check.  We'll contact patient with x-ray results after I've reviewed the report, if nondiagnostic would recommend MRI of lumbar spine for further evaluation.

## 2018-03-12 NOTE — PROGRESS NOTES
Please notify patient x-rays normal of hips, no bony abnormality noted in hips.  I believe she needs an MRI of her lumbar spine to evaluate low back pain radiating to her legs and hips, we discussed this in office.  Please have Jaye place this order.

## 2018-03-16 ENCOUNTER — TRANSCRIBE ORDERS (OUTPATIENT)
Dept: FAMILY MEDICINE CLINIC | Facility: CLINIC | Age: 28
End: 2018-03-16

## 2018-03-16 DIAGNOSIS — G89.29 CHRONIC LOW BACK PAIN WITH SCIATICA, SCIATICA LATERALITY UNSPECIFIED, UNSPECIFIED BACK PAIN LATERALITY: Primary | ICD-10-CM

## 2018-03-16 DIAGNOSIS — M25.551 BILATERAL HIP PAIN: ICD-10-CM

## 2018-03-16 DIAGNOSIS — M25.552 BILATERAL HIP PAIN: ICD-10-CM

## 2018-03-16 DIAGNOSIS — M54.40 CHRONIC LOW BACK PAIN WITH SCIATICA, SCIATICA LATERALITY UNSPECIFIED, UNSPECIFIED BACK PAIN LATERALITY: Primary | ICD-10-CM

## 2018-03-28 ENCOUNTER — OFFICE VISIT (OUTPATIENT)
Dept: OBSTETRICS AND GYNECOLOGY | Facility: CLINIC | Age: 28
End: 2018-03-28

## 2018-03-28 VITALS
RESPIRATION RATE: 14 BRPM | SYSTOLIC BLOOD PRESSURE: 126 MMHG | DIASTOLIC BLOOD PRESSURE: 82 MMHG | BODY MASS INDEX: 36.33 KG/M2 | WEIGHT: 246 LBS

## 2018-03-28 DIAGNOSIS — E03.9 ACQUIRED HYPOTHYROIDISM: ICD-10-CM

## 2018-03-28 DIAGNOSIS — N87.0 MILD DYSPLASIA OF CERVIX (CIN I): Primary | ICD-10-CM

## 2018-03-28 DIAGNOSIS — N92.6 IRREGULAR MENSES: ICD-10-CM

## 2018-03-28 PROCEDURE — 99214 OFFICE O/P EST MOD 30 MIN: CPT | Performed by: OBSTETRICS & GYNECOLOGY

## 2018-03-28 NOTE — PROGRESS NOTES
Subjective   Chief Complaint   Patient presents with   • Abnormal Pap Smear     Brenton Vila is a 28 y.o. year old  presenting to be seen for a PAP in follow-up of a prior abnormal PAP and/or HPV screen.    They've been actively trying to conceive for about 3 years time now.  She used to have unpredictable cycles related to her polycystic ovarian syndrome.  First pregnancy resulted from an unstimulated cycle.  She has attempted Clomid in the past.  At time she was using Clomid her cycles were unpredictable.  Now they have been much more predictable and regular for greater than 6 months time today to have seen nobody for fertility.  They occur every 35-37 days.  They are not willing to consider IVF at the current time.    OTHER THINGS SHE WANTS TO DISCUSS TODAY:  Nothing else     The following portions of the patient's history were reviewed and updated as appropriate:current medications and allergies.    Smoking status: Current Some Day Smoker                                                    Packs/day: 0.50      Years: 0.00         Types: Cigarettes     Start date:      Last attempt to quit: 2017  Smokeless tobacco: Never Used                        Review of Systems  Constitutional POS: nothing reported    NEG: anorexia or night sweats   Genitourinary POS: nothing reported    NEG: dysuria or hematuria      Gastointestinal POS: nothing reported    NEG: bloating, change in bowel habits, melena or reflux symptoms   Integument POS: nothing reported    NEG: moles that are changing in size, shape, color or rashes   Breast POS: nothing reported    NEG: persistent breast lump, skin dimpling or nipple discharge        Objective   /82   Resp 14   Wt 112 kg (246 lb)   LMP 2018   Breastfeeding? No   BMI 36.33 kg/m²     General:  well developed; well nourished  no acute distress   Pelvis: Clinical staff was present for exam  External genitalia:  normal appearance of the external genitalia  including Bartholin's and [place]'s glands.  :  urethral meatus normal;  Vaginal:  normal pink mucosa without prolapse or lesions.  Cervix:  normal appearance.     Lab Review   No data reviewed    Imaging   No data reviewed       Assessment   1. SHARIFA 1 - no normal yet.  Colposcopy without biopsy from October 2017  2. Secondary infertility with prior history of polycystic ovarian syndrome.  Cycles may be consistent with anovulation given the cycle length  3. Hypothyroidism on medication.  It's been greater than 6 months since her last TSH  4. Pre-conceptional - she currently is not taking sufficient folic acid     Plan   1. Pap was done today.  If she does not receive the results of the Pap within 2 weeks  time, she was instructed to call to find out the results.  I explained to Brenton that because she is being seen in follow-up of a previously abnormal Pap smear, her next Pap needs to be performed in 4-6 months.  She will need to be seen roughly every 6 months until 3 consecutive normal Paps have been obtained.  At that point, she can return to routine screening intervals.  2. The following tests were ordered today: TSH, T4 - free and fasting glucose/insulin and d25 progesterone.  It was explained to Brenton that all lab test should be back within the one week after they are performed. She will be notified about the results, regardless of the findings. If she has not been contacted by the office within 2 weeks after the test has been performed, it is her responsibility to contact us to learn about her results.  3. Folic acid for the prevention of neural tube defects was discussed.  At least 0.4 mg should be taken preconceptionally to reduce the risk.  It was explained that this may reduce the baseline incidence of neural tube defect by as much as 65%.  Folic acid can be found in OTC multivitamins, OTC prenatal vitamins or breakfast cereal that that contains 100% of the RDA of folate.  4. The importance of keeping all  planned follow-up and taking all medications as prescribed was emphasized.  5. Follow up for annual exam 10/2018         This note was electronically signed.    Johny Barakat M.D.  March 28, 2018    Note: Speech recognition transcription software may have been used to create portions of this document.  An attempt at proofreading has been made but errors in transcription could still be present.

## 2018-04-04 ENCOUNTER — TELEPHONE (OUTPATIENT)
Dept: OBSTETRICS AND GYNECOLOGY | Facility: CLINIC | Age: 28
End: 2018-04-04

## 2018-04-04 NOTE — TELEPHONE ENCOUNTER
Dr. Barakat patient called wanting pap results from 3/28/2018.  [phone] patient notified of pap smear result. She is scheduled for 6 months repeat pap on 10/1/2018.

## 2018-04-09 ENCOUNTER — LAB (OUTPATIENT)
Dept: LAB | Facility: HOSPITAL | Age: 28
End: 2018-04-09

## 2018-04-09 DIAGNOSIS — N92.6 IRREGULAR MENSES: ICD-10-CM

## 2018-04-09 DIAGNOSIS — E03.9 ACQUIRED HYPOTHYROIDISM: ICD-10-CM

## 2018-04-09 LAB
GLUCOSE BLD-MCNC: 93 MG/DL (ref 70–100)
PROGEST SERPL-MCNC: 0.45 NG/ML
T4 FREE SERPL-MCNC: 1.04 NG/DL (ref 0.89–1.76)
TSH SERPL DL<=0.05 MIU/L-ACNC: 1.6 MIU/ML (ref 0.35–5.35)

## 2018-04-09 PROCEDURE — 83525 ASSAY OF INSULIN: CPT

## 2018-04-09 PROCEDURE — 82947 ASSAY GLUCOSE BLOOD QUANT: CPT

## 2018-04-09 PROCEDURE — 84443 ASSAY THYROID STIM HORMONE: CPT

## 2018-04-09 PROCEDURE — 36415 COLL VENOUS BLD VENIPUNCTURE: CPT

## 2018-04-09 PROCEDURE — 84439 ASSAY OF FREE THYROXINE: CPT

## 2018-04-09 PROCEDURE — 84144 ASSAY OF PROGESTERONE: CPT

## 2018-04-11 DIAGNOSIS — N92.6 IRREGULAR MENSES: Primary | ICD-10-CM

## 2018-04-12 ENCOUNTER — OFFICE VISIT (OUTPATIENT)
Dept: FAMILY MEDICINE CLINIC | Facility: CLINIC | Age: 28
End: 2018-04-12

## 2018-04-12 VITALS
HEART RATE: 89 BPM | HEIGHT: 69 IN | DIASTOLIC BLOOD PRESSURE: 70 MMHG | WEIGHT: 243 LBS | OXYGEN SATURATION: 100 % | SYSTOLIC BLOOD PRESSURE: 110 MMHG | BODY MASS INDEX: 35.99 KG/M2 | TEMPERATURE: 98.4 F

## 2018-04-12 DIAGNOSIS — E66.9 OBESITY (BMI 30-39.9): ICD-10-CM

## 2018-04-12 DIAGNOSIS — F41.8 SITUATIONAL ANXIETY: Primary | ICD-10-CM

## 2018-04-12 DIAGNOSIS — R00.2 HEART PALPITATIONS: ICD-10-CM

## 2018-04-12 DIAGNOSIS — F41.0 PANIC ATTACKS: ICD-10-CM

## 2018-04-12 LAB — INSULIN SERPL-ACNC: 19 UIU/ML

## 2018-04-12 PROCEDURE — 99214 OFFICE O/P EST MOD 30 MIN: CPT | Performed by: PHYSICIAN ASSISTANT

## 2018-04-12 NOTE — PROGRESS NOTES
Subjective   Brenton Vila is a 28 y.o. female  Weight Check (Follow up on weight) and Anxiety (increased irritability x3 weeks )      History of Present Illness  Patient comes in today for evaluation of 2 separate issues one is for follow-up on a chronic stable medical problem, one is for evaluation of a new uncontrolled problem.  Patient is initially here for a follow-up on weight check cessation with obesity.  Her weight is down 3 pounds.  She states she has done well on phentermine and is doing well following a low-calorie, low-carb high-protein diet.  However she states she has been having problems worsening for the past 3 weeks with increased mood lability, increased sensation of panic and anxiety over things have never bothered her before for example she states suddenly she is now fearful getting on elevators afraid that the cords.  She will die, she suddenly afraid of driving on [address] she states none of these things above her bothered her the past and she has a reason why they showed she has no history of experience with injuries or accidents and cars or elevators.  She states all of her moodiness started after taking a high-dose round of prednisone earlier this year.  She thought it got better but now she is continuing to have increased feelings of a motion, cries more easily.  She states she doesn't feel depressed and feels good at home but she is now worrying more and more about having these panic attacks.  She states phentermine is never bothered her in the past but she wants to try stopping it for now just to make sure that is not contributing to this.  She had her gynecologist recently check her TSH was normal.  She has no homicidal or suicidal ideations.  She states Topamax helped control her headaches so she does not feel that this is bothering her infected is helping.  Please see previous office notes going back to her patient did experience anxiety originally associated with prednisone  dosing.  Patient states she's seen a gynecologist recently and discussed the somewhat recheck some labs on her including her TSH and other hormone levels that were essentially normal.  She states she's never had anything like this in the past.  Menstrual cycles are irregular but normal for her.  The following portions of the patient's history were reviewed and updated as appropriate: allergies, current medications, past social history and problem list    Review of Systems   Constitutional: Positive for activity change. Negative for appetite change, fatigue and unexpected weight change.   HENT: Negative.    Respiratory: Negative for chest tightness and shortness of breath.    Cardiovascular: Positive for palpitations ( At times only when she feels anxious and panicky.). Negative for chest pain.   Gastrointestinal: Negative for abdominal distention, abdominal pain, diarrhea and nausea.   Genitourinary: Negative.    Musculoskeletal: Negative.    Skin: Negative.    Allergic/Immunologic: Negative.    Neurological: Negative for dizziness, tremors, weakness, light-headedness and headaches.   Hematological: Negative for adenopathy.   Psychiatric/Behavioral: Positive for agitation. Negative for behavioral problems, confusion, decreased concentration, dysphoric mood, hallucinations, self-injury, sleep disturbance and suicidal ideas. The patient is nervous/anxious. The patient is not hyperactive.        Objective     Vitals:    04/12/18 0805   BP: 110/70   Pulse: 89   Temp: 98.4 °F (36.9 °C)   SpO2: 100%       Physical Exam   Constitutional: She is oriented to person, place, and time. She appears well-developed and well-nourished.   Obesity noted     Neck: No thyroid mass and no thyromegaly present.   Cardiovascular: Normal rate, regular rhythm and normal heart sounds.    Pulmonary/Chest: Effort normal and breath sounds normal.   Abdominal: Soft. There is no tenderness.   Neurological: She is alert and oriented to person,  place, and time.   Psychiatric: Her speech is normal and behavior is normal. Judgment and thought content normal. Her mood appears anxious. Her affect is not angry and not inappropriate. Cognition and memory are normal. She does not exhibit a depressed mood.   Per pleasant well-developed well-nourished white female in no acute distress, conversational, good eye contact, has good insight into her triggers.  She does appear anxious about her recent expenses of panic. She is attentive.   Nursing note and vitals reviewed.      Assessment/Plan     Diagnoses and all orders for this visit:    Situational anxiety    Obesity (BMI 30-39.9)    Panic attacks    Other orders  -     sertraline (ZOLOFT) 50 MG tablet; Take 1 tablet by mouth Daily.    Discussed nature of panic attacks and situational anxiety, started on Zoloft, prescription given for Klonopin 0.5 mg take one daily as needed if panic arises, discussed abuse potential this medication and intention of being short-term usage.  We'll refer to psychologist for CBT if panic attacks persist.  Will hold phentermine for this month and have patient continue with high-protein low-carb diet for weight loss, follow-up in one month to recheck sooner if symptoms worsen or 2 weeks if unimproved.

## 2018-04-13 ENCOUNTER — LAB (OUTPATIENT)
Dept: LAB | Facility: HOSPITAL | Age: 28
End: 2018-04-13

## 2018-04-13 DIAGNOSIS — N92.6 IRREGULAR MENSES: ICD-10-CM

## 2018-04-13 LAB — PROGEST SERPL-MCNC: 0.65 NG/ML

## 2018-04-13 PROCEDURE — 84144 ASSAY OF PROGESTERONE: CPT

## 2018-04-13 PROCEDURE — 36415 COLL VENOUS BLD VENIPUNCTURE: CPT

## 2018-04-17 RX ORDER — TOPIRAMATE 25 MG/1
TABLET ORAL
Qty: 60 TABLET | Refills: 5 | Status: SHIPPED | OUTPATIENT
Start: 2018-04-17 | End: 2018-12-10 | Stop reason: SDUPTHER

## 2018-05-10 ENCOUNTER — OFFICE VISIT (OUTPATIENT)
Dept: OBSTETRICS AND GYNECOLOGY | Facility: CLINIC | Age: 28
End: 2018-05-10

## 2018-05-10 ENCOUNTER — TELEPHONE (OUTPATIENT)
Dept: OBSTETRICS AND GYNECOLOGY | Facility: CLINIC | Age: 28
End: 2018-05-10

## 2018-05-10 ENCOUNTER — APPOINTMENT (OUTPATIENT)
Dept: LAB | Facility: HOSPITAL | Age: 28
End: 2018-05-10

## 2018-05-10 ENCOUNTER — OFFICE VISIT (OUTPATIENT)
Dept: FAMILY MEDICINE CLINIC | Facility: CLINIC | Age: 28
End: 2018-05-10

## 2018-05-10 VITALS
BODY MASS INDEX: 37.18 KG/M2 | TEMPERATURE: 98.7 F | SYSTOLIC BLOOD PRESSURE: 128 MMHG | OXYGEN SATURATION: 100 % | HEART RATE: 106 BPM | HEIGHT: 69 IN | DIASTOLIC BLOOD PRESSURE: 72 MMHG | WEIGHT: 251 LBS

## 2018-05-10 VITALS — WEIGHT: 250 LBS | BODY MASS INDEX: 36.92 KG/M2 | RESPIRATION RATE: 14 BRPM

## 2018-05-10 DIAGNOSIS — N92.6 IRREGULAR MENSES: Primary | ICD-10-CM

## 2018-05-10 DIAGNOSIS — F41.9 ANXIETY: Primary | ICD-10-CM

## 2018-05-10 PROBLEM — E16.1 HYPERINSULINEMIA: Status: ACTIVE | Noted: 2018-05-10

## 2018-05-10 LAB — HCG INTACT+B SERPL-ACNC: <5 MIU/ML

## 2018-05-10 PROCEDURE — 99213 OFFICE O/P EST LOW 20 MIN: CPT | Performed by: OBSTETRICS & GYNECOLOGY

## 2018-05-10 PROCEDURE — 36415 COLL VENOUS BLD VENIPUNCTURE: CPT | Performed by: OBSTETRICS & GYNECOLOGY

## 2018-05-10 PROCEDURE — 99213 OFFICE O/P EST LOW 20 MIN: CPT | Performed by: PHYSICIAN ASSISTANT

## 2018-05-10 PROCEDURE — 84702 CHORIONIC GONADOTROPIN TEST: CPT | Performed by: OBSTETRICS & GYNECOLOGY

## 2018-05-10 RX ORDER — CLONAZEPAM 0.5 MG/1
TABLET ORAL
COMMUNITY
Start: 2018-04-12 | End: 2018-12-26

## 2018-05-10 RX ORDER — MEDROXYPROGESTERONE ACETATE 10 MG/1
10 TABLET ORAL DAILY
Qty: 10 TABLET | Refills: 0 | Status: SHIPPED | OUTPATIENT
Start: 2018-05-10 | End: 2018-10-02 | Stop reason: SDUPTHER

## 2018-05-10 NOTE — TELEPHONE ENCOUNTER
Dr. Barakat patient  [phone] spoke with patient regarding her Beta HCG result. Patient verbalized understanding.

## 2018-05-10 NOTE — PROGRESS NOTES
Subjective   Brenton Vila is a 28 y.o. female  Anxiety (Follow up on anxiety, req refill on Klonopin )      History of Present Illness  Patient comes in today for follow-up on generalized anxiety disorder and panic attack disorder.  She started seeing a therapist and is seen him twice.  She states this been very helpful to build to talk to an unbiased person about problems she's having in her family situation as well as at work.  They've identified several triggers and are working through behavioral modifications and coping strategies.  She feels the Zoloft is helping, she states initially she was needing to take Klonopin daily now she is taking it about every other day.  No adverse effects from either medication.  She has no homicidal or suicidal ideations.  The following portions of the patient's history were reviewed and updated as appropriate: allergies, current medications, past social history and problem list    Review of Systems   Constitutional: Negative for appetite change and fatigue.   Respiratory: Negative for chest tightness and shortness of breath.    Gastrointestinal: Negative for abdominal pain, diarrhea and nausea.   Neurological: Negative for dizziness, tremors, weakness, light-headedness and headaches.   Psychiatric/Behavioral: Negative for agitation, behavioral problems, confusion, decreased concentration, dysphoric mood, sleep disturbance and suicidal ideas. The patient is nervous/anxious.        Objective     Vitals:    05/10/18 0854   BP: 128/72   Pulse: 106   Temp: 98.7 °F (37.1 °C)   SpO2: 100%       Physical Exam   Constitutional: She is oriented to person, place, and time. She appears well-developed and well-nourished.   Neck: No thyroid mass and no thyromegaly present.   Cardiovascular: Normal rate, regular rhythm and normal heart sounds.    Pulmonary/Chest: Effort normal.   Neurological: She is alert and oriented to person, place, and time.   Psychiatric: Her speech is normal and  behavior is normal. Judgment and thought content normal. Her mood appears anxious. Her affect is not angry and not inappropriate. Cognition and memory are normal. She does not exhibit a depressed mood. She is attentive.   Nursing note and vitals reviewed.      Assessment/Plan     Diagnoses and all orders for this visit:    Anxiety  -     sertraline (ZOLOFT) 50 MG tablet; Take 1 tablet by mouth Daily.    Other orders  -     clonazePAM (KlonoPIN) 0.5 MG tablet; Once daily    Continue on Zoloft at current dosage, refilled Klonopin 0.5 mg 1 daily on an as-needed basis for anxiety or panic #30 with 2 refills, follow-up in office in 3 months for recheck, sooner if symptoms worsen.  Discussed abuse potential of Klonopin and suggestions to take it only as needed.  Andrew reviewed, appropriate.

## 2018-05-10 NOTE — TELEPHONE ENCOUNTER
Provider Name  Dr Barakat  Reason for Call  Returning call to nurse regarding results  Pharmacy Name  [facility] in [place], [place]  Call Back Number  [phone]

## 2018-05-10 NOTE — PROGRESS NOTES
Subjective   Chief Complaint   Patient presents with   • Medication Reaction     Brenton Vila is a 28 y.o. year old .  Patient's last menstrual period was 03/10/2018 (approximate).  She presents to be seen because of Wants to discuss fertility medication.  When she was last here labs were done.  TSH and T4 were normal.  Progesterone was low.  Glucose and insulin levels were suggestive of hyperinsulinism.  She also was instructed less time to starting folic acid supplement which she had not been taking.  He reports that she has begun taking a vitamin with folic acid.    She is late for her cycle.  She's not recently checked a pregnancy test.    The following portions of the patient's history were reviewed and updated as appropriate:allergies    Smoking status: Current Some Day Smoker                                                    Packs/day: 0.50      Years: 0.00         Types: Cigarettes     Start date:      Last attempt to quit: 2017  Smokeless tobacco: Never Used                             Objective   Resp 14   Wt 113 kg (250 lb)   LMP 03/10/2018 (Approximate)   Breastfeeding? No   BMI 36.92 kg/m²     Lab Review   as above    Imaging   No data reviewed        Assessment   1. 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 We'll check a pregnancy test today.  Assuming this is negative I like to give her Provera for 10 days to induce menses  2. We'll begin with Clomid 50 mg day 5 through 9  3. Ovulation predictor kit testing was recommended  4. I need to see her with onset of menses or by day 35 menses have not occurred  5. Fail to achieve ovulation by 150 mg of Clomid would consider adding Glucophage at that point  6. The importance of keeping all planned follow-up and taking all medications as prescribed was emphasized.      New Medications Ordered This Visit   Medications   • medroxyPROGESTERone (PROVERA) 10 MG tablet     Sig: Take 1 tablet by  mouth Daily for 10 days.     Dispense:  10 tablet     Refill:  0   • clomiPHENE (CLOMID) 50 MG tablet     Si tablet daily day 5 through 9     Dispense:  5 tablet     Refill:  0          This note was electronically signed.    Johny Barakat M.D.  May 10, 2018    Total time spent today with Brenton  was 20 minutes (level 3).  Off this time, 100% was spent face-to-face time coordinating care, answering her questions and counseling regarding pathophysiology of her presenting problem along with plans for any diagnositc work-up and treatment.    Note: Speech recognition transcription software may have been used to create portions of this document.  An attempt at proofreading has been made but errors in transcription could still be present.

## 2018-05-22 ENCOUNTER — TELEPHONE (OUTPATIENT)
Dept: OBSTETRICS AND GYNECOLOGY | Facility: CLINIC | Age: 28
End: 2018-05-22

## 2018-05-22 NOTE — TELEPHONE ENCOUNTER
Provider Name  Dr Barakat    Reason for Call  Was taking Provera, had spotting the entire time, stopped taking Provera two days ago, continues to bleed     Pharmacy Name  [facility] in [place], [place]    Call Back Number  [phone]

## 2018-05-23 ENCOUNTER — OFFICE VISIT (OUTPATIENT)
Dept: FAMILY MEDICINE CLINIC | Facility: CLINIC | Age: 28
End: 2018-05-23

## 2018-05-23 VITALS
WEIGHT: 252 LBS | BODY MASS INDEX: 37.33 KG/M2 | HEIGHT: 69 IN | DIASTOLIC BLOOD PRESSURE: 78 MMHG | HEART RATE: 90 BPM | SYSTOLIC BLOOD PRESSURE: 118 MMHG | TEMPERATURE: 98.3 F | OXYGEN SATURATION: 99 %

## 2018-05-23 DIAGNOSIS — S62.664A CLOSED NONDISPLACED FRACTURE OF DISTAL PHALANX OF RIGHT RING FINGER, INITIAL ENCOUNTER: Primary | ICD-10-CM

## 2018-05-23 PROCEDURE — 99213 OFFICE O/P EST LOW 20 MIN: CPT | Performed by: PHYSICIAN ASSISTANT

## 2018-05-23 RX ORDER — HYDROCODONE BITARTRATE AND ACETAMINOPHEN 5; 325 MG/1; MG/1
TABLET ORAL
COMMUNITY
Start: 2018-05-20 | End: 2018-08-13

## 2018-05-23 NOTE — PROGRESS NOTES
Subjective   Brenton Vila is a 28 y.o. female  Finger Injury (Follow up from [facility] for broken right ring finger )      History of Present Illness  Patient comes in today for follow-up on avulsion fracture of the fourth digit right hand, diagnosed at [facility] in [place] on Saturday.  She states that she has a large dog that she was walking and his leash was wrapped around her finger and caused it to be pulled.  She denies any numbness.  She has been wearing a splint that was placed by the ER and other than having this get in the way of daily activities and is doing better.  No increased swelling.  The following portions of the patient's history were reviewed and updated as appropriate: allergies, current medications, past social history and problem list    Review of Systems   Constitutional: Positive for activity change.   Musculoskeletal: Positive for arthralgias ( Tenderness at fracture site right hand fourth digit). Negative for gait problem, joint swelling and myalgias.   Skin: Negative.    Neurological: Negative for weakness and numbness.       Objective     Vitals:    05/23/18 0811   BP: 118/78   Pulse: 90   Temp: 98.3 °F (36.8 °C)   SpO2: 99%       Physical Exam   Constitutional: She is oriented to person, place, and time. She appears well-developed and well-nourished. No distress.   Cardiovascular: Normal rate and intact distal pulses.    Pulmonary/Chest: Effort normal.   Musculoskeletal: She exhibits tenderness and deformity ( Slight lateral inclination noted at distal portion of fourth digit right hand). She exhibits no edema.   moderate tenderness at distal fourth digit right hand over area noted to have avulsion fracture, neurovascular status intact.   Neurological: She is alert and oriented to person, place, and time.   Skin: Skin is warm and dry. Capillary refill takes less than 2 seconds. No rash noted. She is not diaphoretic. No erythema. No pallor.   No ecchymosis or bruising  noted right hand   Nursing note and vitals reviewed.      Assessment/Plan     Diagnoses and all orders for this visit:    Closed nondisplaced fracture of distal phalanx of right ring finger, initial encounter    Other orders  -     HYDROcodone-acetaminophen (NORCO) 5-325 MG per tablet;       Advised patient to continue wearingbrace/splint on right hand fourth digit  That was provided to her at the emergency departmentfor the next week and follow-up with me in one week for recheck.

## 2018-06-06 ENCOUNTER — TELEPHONE (OUTPATIENT)
Dept: OBSTETRICS AND GYNECOLOGY | Facility: CLINIC | Age: 28
End: 2018-06-06

## 2018-06-06 NOTE — TELEPHONE ENCOUNTER
Pt states she was seen in the Er today in [place], will be coming in for a follow up appt with . 6/7/18

## 2018-06-06 NOTE — TELEPHONE ENCOUNTER
If she is in severe pain she may need to come in to get an ultrasound done to make sure she doesn't have large ovarian cysts

## 2018-06-06 NOTE — TELEPHONE ENCOUNTER
Pt called very upset and in a lot of pain, stated she took Clomid and thinks shes in labor. (she's not pregnant) She just needs to know what to do.    Wants a call back asap

## 2018-06-07 ENCOUNTER — OFFICE VISIT (OUTPATIENT)
Dept: FAMILY MEDICINE CLINIC | Facility: CLINIC | Age: 28
End: 2018-06-07

## 2018-06-07 ENCOUNTER — OFFICE VISIT (OUTPATIENT)
Dept: OBSTETRICS AND GYNECOLOGY | Facility: CLINIC | Age: 28
End: 2018-06-07

## 2018-06-07 VITALS
HEIGHT: 69 IN | BODY MASS INDEX: 37.33 KG/M2 | DIASTOLIC BLOOD PRESSURE: 60 MMHG | SYSTOLIC BLOOD PRESSURE: 120 MMHG | HEART RATE: 101 BPM | TEMPERATURE: 98.7 F | WEIGHT: 252 LBS | OXYGEN SATURATION: 99 %

## 2018-06-07 VITALS
SYSTOLIC BLOOD PRESSURE: 118 MMHG | DIASTOLIC BLOOD PRESSURE: 80 MMHG | WEIGHT: 254 LBS | RESPIRATION RATE: 16 BRPM | BODY MASS INDEX: 37.49 KG/M2

## 2018-06-07 DIAGNOSIS — S62.664A CLOSED NONDISPLACED FRACTURE OF DISTAL PHALANX OF RIGHT RING FINGER, INITIAL ENCOUNTER: ICD-10-CM

## 2018-06-07 DIAGNOSIS — N93.9 ABNORMAL UTERINE BLEEDING: ICD-10-CM

## 2018-06-07 DIAGNOSIS — M54.50 LOW BACK PAIN WITHOUT SCIATICA, UNSPECIFIED BACK PAIN LATERALITY, UNSPECIFIED CHRONICITY: Primary | ICD-10-CM

## 2018-06-07 DIAGNOSIS — N92.6 IRREGULAR MENSES: Primary | ICD-10-CM

## 2018-06-07 DIAGNOSIS — R10.2 PELVIC PAIN: ICD-10-CM

## 2018-06-07 LAB
BILIRUB BLD-MCNC: NEGATIVE MG/DL
CLARITY, POC: NORMAL
COLOR UR: NORMAL
GLUCOSE UR STRIP-MCNC: NEGATIVE MG/DL
KETONES UR QL: NEGATIVE
LEUKOCYTE EST, POC: NEGATIVE
NITRITE UR-MCNC: NEGATIVE MG/ML
PH UR: 5 [PH] (ref 5–8)
PROT UR STRIP-MCNC: NEGATIVE MG/DL
RBC # UR STRIP: NEGATIVE /UL
SP GR UR: 1.02 (ref 1–1.03)
UROBILINOGEN UR QL: NORMAL

## 2018-06-07 PROCEDURE — 81003 URINALYSIS AUTO W/O SCOPE: CPT | Performed by: PHYSICIAN ASSISTANT

## 2018-06-07 PROCEDURE — 99213 OFFICE O/P EST LOW 20 MIN: CPT | Performed by: OBSTETRICS & GYNECOLOGY

## 2018-06-07 PROCEDURE — 99213 OFFICE O/P EST LOW 20 MIN: CPT | Performed by: PHYSICIAN ASSISTANT

## 2018-06-07 NOTE — PROGRESS NOTES
Subjective   Brenton Vila is a 28 y.o. female  Hand Pain (4th digit fx FU)      History of Present Illness  Patient's here for 2 separate things she's here for follow-up on fracture and fourth digit right hand see previous office notes states is feeling a lot better she's able to bend her finger completely now.  Second issue is that she was seen in the emergency room yesterday in [place].  She states she awoke from sleep with severe excruciating pain in her pelvis on the left side in her left lower back.  She states she delivered her children without an epidural and this pain felt worse than that.  She states they went to the ER and she couldn't get comfortable she's having to pace because of the pain.  She still she felt a tremendous amount of pressure in her pelvis.  They did an ultrasound and saw some free fluid, labs were done which were normal, CT scan was not done and urinalysis results were not sent to us.  She states she feels better than yesterday she still feels a lot of pressure and aching in her left lower back.  The following portions of the patient's history were reviewed and updated as appropriate: allergies, current medications, past social history and problem list    Review of Systems   Constitutional: Negative for fever.   Genitourinary: Positive for flank pain. Negative for dysuria and hematuria.   Musculoskeletal: Positive for back pain and myalgias ( Hand pain much better).       Objective     Vitals:    06/07/18 1429   BP: 120/60   Pulse: 101   Temp: 98.7 °F (37.1 °C)   SpO2: 99%       Physical Exam   Constitutional: She appears well-developed and well-nourished. No distress.   Abdominal: There is no tenderness.   Musculoskeletal: Normal range of motion. She exhibits tenderness ( Minimal tenderness at DIP joint right hand fourth digit, full flexion and extension without limitation, no deformity noted.). She exhibits no edema or deformity.   Skin: She is not diaphoretic.       Assessment/Plan      Diagnoses and all orders for this visit:    Low back pain without sciatica, unspecified back pain laterality, unspecified chronicity  -     POC Urinalysis Dipstick, Automated    Closed nondisplaced fracture of distal phalanx of right ring finger, initial encounter    Discussed recommendations for rehabilitating hand through stress ball exercises.  Patient is a point with GYN this afternoon for further evaluation of ovarian cyst noted on CT scan, as urinalysis was negative discussed with her less likely that her symptoms are associated with urolithiasis but if flank pain or pelvic pain persists and GYN determines it is non-gynecological cause would recommend CT scan kidney stone protocol for further evaluation.  Discussed importance of drinking adequate water.

## 2018-06-08 ENCOUNTER — TELEPHONE (OUTPATIENT)
Dept: FAMILY MEDICINE CLINIC | Facility: CLINIC | Age: 28
End: 2018-06-08

## 2018-06-08 RX ORDER — LEVOTHYROXINE SODIUM 0.03 MG/1
TABLET ORAL
Qty: 30 TABLET | Refills: 5 | Status: SHIPPED | OUTPATIENT
Start: 2018-06-08 | End: 2018-07-30 | Stop reason: SDUPTHER

## 2018-06-08 NOTE — TELEPHONE ENCOUNTER
Please call patient back, inform her that no, a slight elevation in her white blood cell count should not be significant at all.  Fluctuation of a couple of points like that is commonly seen.

## 2018-06-08 NOTE — TELEPHONE ENCOUNTER
----- Message from Brenton Vila sent at 6/8/2018  9:42 AM EDT -----  Regarding: Non-Urgent Medical Question  Contact: [phone]  I just looked at my labs and I had an elevated wbc and didn't know if that could be my issue.

## 2018-06-11 NOTE — PROGRESS NOTES
Subjective   Chief Complaint   Patient presents with   • Abdominal Pain     went to the ER in [facility] yesterday, finished cycle of clomid almost 2 wks ago / u/s done also   Out of town.  At the CAT scan/ultrasound is reviewed and does not show anything significant as far as cyst.  She is complaining of severe pain and I've asked her to be seen because of the risk for hyperstimulation while on Clomid.  Apparently she had not ovulated on that particular dose there is no evidence of that on the CAT scan or with cyclicity irregularity.  She did not appear to ovulated on the Clomid.  Brenton Vila is a 28 y.o. year old  presenting to be seen because of Issues with pelvic pain.  She was seen in emergency room in [place]   Current birth control method: none.     Past 6 month menstrual and contraceptive history:    No LMP recorded.  Cycle Frequency: Irregular                            The following portions of the patient's history were reviewed and updated as appropriate:problem list, current medications and allergies    Review of Systems      Objective   /80   Resp 16   Wt 115 kg (254 lb)   BMI 37.49 kg/m²     General:  well developed; well nourished  no acute distress  appears stated age   Skin:  No suspicious lesions seen   Thyroid: not examined   Lungs:  breathing is unlabored   Heart:  Not performed.       Abdomen: soft, non-tender; no masses  no umbilical or inginual hernias are present  no hepato-splenomegaly   No rebound    Pelvis: Not performed.     Lab Review   No data reviewed    Imaging   CT of abdomen/pelvis report       Assessment   1. Severe pelvic pain of unknown etiology.  At least we can rule out any hyperstimulation based on Clomid.  However the 50 mg of Clomid does not appear to be strong enough to get her to ovulate so I discussed with her that we probably need to go 100 mg cycle days 5 through 9.  She can follow up with Dr. Barakat is currently out of town.  She is agreeable to this  course of action.     Plan   1. Next cycle will start Clomid to 50 mg tablets cycle days 5 through 9 check ovulation kits cycle days 14 through 17 and timed intercourse as before follow up with Dr. Barakat if not pregnant within a few months.  2. Call if any questions.    Medications Rx this encounter:  New Medications Ordered This Visit   Medications   • clomiPHENE (CLOMID) 50 MG tablet     Si tablets daily day 5 through 9     Dispense:  10 tablet     Refill:  2          This note was electronically signed.This note was completed based on paper notes taken 2028    Danny Camarillo MD  2018     Yes

## 2018-07-20 ENCOUNTER — TELEPHONE (OUTPATIENT)
Dept: FAMILY MEDICINE CLINIC | Facility: CLINIC | Age: 28
End: 2018-07-20

## 2018-07-20 NOTE — TELEPHONE ENCOUNTER
----- Message from Annetta Nix sent at 7/20/2018 10:19 AM EDT -----  Contact: message in my chart  Appointment Request From: Brenton Vila    With Provider: Mahnaz Dutton PA-C [[facility]]    Preferred Date Range: From 8/9/2018 To 8/9/2018    Preferred Times: Any    Reason: To address the following health maintenance concerns.  Annual Physical    Comments:  Can we combine my follow up and annual  physical

## 2018-07-21 RX ORDER — MEDROXYPROGESTERONE ACETATE 10 MG/1
10 TABLET ORAL DAILY
Qty: 10 TABLET | Refills: 0 | Status: SHIPPED | OUTPATIENT
Start: 2018-07-21 | End: 2018-07-31

## 2018-07-23 ENCOUNTER — TELEPHONE (OUTPATIENT)
Dept: FAMILY MEDICINE CLINIC | Facility: CLINIC | Age: 28
End: 2018-07-23

## 2018-07-23 NOTE — TELEPHONE ENCOUNTER
----- Message from Brenton Vila sent at 7/23/2018 11:58 AM EDT -----  Regarding: RE: Non-Urgent Medical Question  Contact: [phone]  [facility].   ----- Message -----  From: NORBERT BENÍTEZ  Sent: 7/23/2018 11:54 AM EDT  To: Brenton Vila  Subject: RE: Non-Urgent Medical Question  Brenton,    Which ER did you go to? We need to call them and get your records.        ----- Message -----     From: Brenton Vila     Sent: 7/23/2018 11:43 AM EDT       To: Mahnaz Dutton PA-C  Subject: Non-Urgent Medical Question    Hey I was back in the er Friday. I've got my records and wbc was 11.7 a/g ratio low and lymph number is high. I tried to get an appointment, but your full. I'm still having pain occasionally. What should I do?

## 2018-07-27 ENCOUNTER — TELEPHONE (OUTPATIENT)
Dept: OBSTETRICS AND GYNECOLOGY | Facility: CLINIC | Age: 28
End: 2018-07-27

## 2018-07-27 DIAGNOSIS — N92.6 IRREGULAR MENSES: Primary | ICD-10-CM

## 2018-07-27 NOTE — TELEPHONE ENCOUNTER
Returned her call today.  She is currently on day 6 of Provera.  My advice was to stop the Provera and start taking Clomid 150 mg on day for this month.  I want her to come in around day 21 to get a serum progesterone drawn.      Johny Barakat M.D.  July 27, 2018  1:20 PM        ----- Message from Brenton Vila sent at 7/27/2018  7:11 AM EDT -----  Regarding: RE: Non-Urgent Medical Question  Contact: [phone]  Do I count yesterday as cd1 or wait till provera is gone?  ----- Message -----  From: Johny Barakat MD  Sent: 7/26/2018  4:22 PM EDT  To: Brenton Vila  Subject: RE: Non-Urgent Medical Question  I would finish the Provera and see how things go from there.  If you continue to have severe pain, let me know and we may have to look into this further.    ----- Message -----     From: Brenton Vila     Sent: 7/26/2018 11:42 AM EDT       To: Johny Barakat MD  Subject: RE: Non-Urgent Medical Question    I am only on day three of provera and I am having very heavy brown bleeding and severe back pain and abdominal pain on my left side. What do I do?  ----- Message -----  From: Johny Barakat MD  Sent: 7/23/2018 10:05 AM EDT  To: Brenton Vila  Subject: RE: Non-Urgent Medical Question  Not critical if you can't get those results.  So long as her feeling better but the main thing.    Dr. Barakat        ----- Message -----     From: Brenton Vila     Sent: 7/23/2018  7:49 AM EDT       To: Johny Barakat MD  Subject: RE: Non-Urgent Medical Question    All the blood work came back good. The doctor is an exam. I am trying to get the results now.   ----- Message -----  From: Johny Barakat MD  Sent: 7/21/2018  2:48 PM EDT  To: Brenton Vila  Subject: RE: Non-Urgent Medical Question  If you're now on day 35 without a cycle I would go ahead and take the Provera.  Next month at bump the dose of Clomid up to 150 mg for 5 days.  So far as the pain you having goes, usually at 2.4 cm  cyst in and of itself should not cause pain.  We see those all the time and asymptomatic people.  I'm curious if they had any other explanations besides the cyst to explain the pain.    Have called in the Clomid along with the Provera.  Good luck this coming month.        ----- Message -----     From: Brenton Vila     Sent: 7/21/2018  8:54 AM EDT       To: Johny Barakat MD  Subject: RE: Non-Urgent Medical Question    I'm day 34 now. I ended up in the er with horrible pelvic pain last night and they found a cyst that is 2.4 cm on my left ovary.   ----- Message -----  From: Johny Barakat MD  Sent: 7/20/2018  4:39 PM EDT  To: Brenton Vila  Subject: RE: Non-Urgent Medical Question  How late are you?  What day of the cycle is?        ----- Message -----     From: Brenton Vila     Sent: 7/19/2018  5:08 PM EDT       To: Johny Barakat MD  Subject: Non-Urgent Medical Question    I took 100mg Clomid this month and I'm late, but have neg test. Do I start provera?

## 2018-07-30 ENCOUNTER — OFFICE VISIT (OUTPATIENT)
Dept: OBSTETRICS AND GYNECOLOGY | Facility: CLINIC | Age: 28
End: 2018-07-30

## 2018-07-30 VITALS — WEIGHT: 258 LBS | RESPIRATION RATE: 14 BRPM | BODY MASS INDEX: 38.08 KG/M2

## 2018-07-30 DIAGNOSIS — R10.32 LLQ ABDOMINAL PAIN: Primary | ICD-10-CM

## 2018-07-30 DIAGNOSIS — R19.7 DIARRHEA, UNSPECIFIED TYPE: ICD-10-CM

## 2018-07-30 PROCEDURE — 99213 OFFICE O/P EST LOW 20 MIN: CPT | Performed by: OBSTETRICS & GYNECOLOGY

## 2018-07-30 NOTE — PROGRESS NOTES
Subjective   Chief Complaint   Patient presents with   • Abdominal Pain     with back pain      Brenton Vila is a 28 y.o. year old .  Patient's last menstrual period was 2018 (approximate).  She presents to be seen because of worsening left lower quadrant pain.  She is using Clomid in an effort to conceive.  She had a negative pregnancy test a week ago.  She is on her menses today.  She's been seen in the emergency room and evaluated for kidney stones.  Those tests were clear.  Constipation has not been a problem but she has been having diarrhea for the last several months.    The following portions of the patient's history were reviewed and updated as appropriate:current medications and allergies    Smoking status: Current Some Day Smoker                                                    Packs/day: 0.50      Years: 0.00         Types: Cigarettes     Start date:      Last attempt to quit: 2017  Smokeless tobacco: Never Used                             Objective   Resp 14   Wt 117 kg (258 lb)   LMP 2018 (Approximate)   Breastfeeding? No   BMI 38.08 kg/m²     Lab Review   No data reviewed    Imaging   No data reviewed        Assessment   1. Left lower quadrant pain impacting quality of life.  Unlikely to be gynecologic but ovarian hyperstimulation cannot be excluded.  Anticipate maybe more properly be connected to the diarrhea which is also present     Plan   1. The following tests were ordered today: HCG.  It was explained to Brenton that all lab test should be back within the one week after they are performed. She will be notified about the results, regardless of the findings. If she has not been contacted by the office within 2 weeks after the test has been performed, it is her responsibility to contact us to learn about her results.  2. Ultrasound needs to be done ASAP.   3. The importance of keeping all planned follow-up and taking all medications as prescribed was  emphasized.  4. Follow up after ultrasound           This note was electronically signed.    Johny Barakat M.D.  July 30, 2018    Total time spent today with Brenton  was 20 minutes (level 3).  Off this time, 100% was spent face-to-face time coordinating care, answering her questions and counseling regarding pathophysiology of her presenting problem along with plans for any diagnositc work-up and treatment.    Note: Speech recognition transcription software may have been used to create portions of this document.  An attempt at proofreading has been made but errors in transcription could still be present.

## 2018-08-09 ENCOUNTER — OFFICE VISIT (OUTPATIENT)
Dept: FAMILY MEDICINE CLINIC | Facility: CLINIC | Age: 28
End: 2018-08-09

## 2018-08-09 VITALS
OXYGEN SATURATION: 99 % | HEIGHT: 69 IN | BODY MASS INDEX: 38.36 KG/M2 | TEMPERATURE: 98.4 F | WEIGHT: 259 LBS | SYSTOLIC BLOOD PRESSURE: 122 MMHG | HEART RATE: 92 BPM | DIASTOLIC BLOOD PRESSURE: 86 MMHG

## 2018-08-09 DIAGNOSIS — F41.9 ANXIETY: ICD-10-CM

## 2018-08-09 DIAGNOSIS — M54.41 LEFT-SIDED LOW BACK PAIN WITH BILATERAL SCIATICA, UNSPECIFIED CHRONICITY: Primary | ICD-10-CM

## 2018-08-09 DIAGNOSIS — M25.552 PAIN OF LEFT HIP JOINT: ICD-10-CM

## 2018-08-09 DIAGNOSIS — M54.42 LEFT-SIDED LOW BACK PAIN WITH BILATERAL SCIATICA, UNSPECIFIED CHRONICITY: Primary | ICD-10-CM

## 2018-08-09 PROCEDURE — 99214 OFFICE O/P EST MOD 30 MIN: CPT | Performed by: PHYSICIAN ASSISTANT

## 2018-08-09 RX ORDER — LEVOTHYROXINE SODIUM 0.03 MG/1
25 TABLET ORAL ONCE
Qty: 1 TABLET | Refills: 11 | Status: SHIPPED | OUTPATIENT
Start: 2018-08-09 | End: 2018-08-09

## 2018-08-09 NOTE — PROGRESS NOTES
Subjective   Brenton Vila is a 28 y.o. female  Anxiety (Follow up on anxiety) and Hip Pain (ongoing left hip pain )      History of Present Illness  Patient comes in today for 2 separate issues, she is here for follow-up on generalized anxiety disorder which is currently stable on Zoloft daily with occasional Klonopin as needed for panic attacks.  She is due for refills on both medications.  Denies adverse effects from these meds.  She is also here for follow-up on worsening persistent left hip pain.  Please see previous office notes going back to June 7.  Left hip pain started abruptly with left low back pain and left pelvic pain in June.  She states the pelvic pain has gone away yet the left low back pain has persisted going into her left hip.  She states it is constant and has been constant for the past month.  She states she cannot get comfortable either sitting, standing or laying down.  It is not tender to touch but feels deep.  She states is difficult to walk because of her pain in her left hip and low back.  X-ray was done and was normal of lumbar spine and hip.  We have tried to get an MRI scheduled but she needs to attend physical therapy first for authorization from her insurance.  She states she went to the chiropractor yesterday and symptoms worsened.  The following portions of the patient's history were reviewed and updated as appropriate: allergies, current medications, past social history and problem list    Review of Systems   Constitutional: Positive for activity change. Negative for appetite change and fatigue.   Respiratory: Negative for chest tightness and shortness of breath.    Gastrointestinal: Negative for abdominal pain, diarrhea and nausea.   Genitourinary: Negative.  Negative for pelvic pain.   Musculoskeletal: Positive for arthralgias, back pain, gait problem and myalgias. Negative for joint swelling.   Skin: Negative.    Neurological: Negative for dizziness, tremors, weakness,  light-headedness, numbness and headaches.   Psychiatric/Behavioral: Positive for sleep disturbance. Negative for agitation, behavioral problems, confusion, decreased concentration, dysphoric mood and suicidal ideas. The patient is nervous/anxious.        Objective     Vitals:    08/09/18 0807   BP: 122/86   Pulse: 92   Temp: 98.4 °F (36.9 °C)   SpO2: 99%       Physical Exam   Constitutional: She is oriented to person, place, and time. She appears well-developed and well-nourished. No distress.   Eyes: Conjunctivae are normal.   Cardiovascular: Normal rate and regular rhythm.    Pulmonary/Chest: Effort normal and breath sounds normal.   Abdominal: Soft. Bowel sounds are normal. She exhibits no distension and no mass. There is tenderness ( Tenderness left low back lumbar sacral region and the left posterior hip with flexion and extension.). There is no rebound and no guarding. No hernia.   Musculoskeletal: She exhibits tenderness.        Lumbar back: She exhibits decreased range of motion, tenderness, bony tenderness and pain. She exhibits no swelling, no deformity and no spasm.   Gait is antalgic, patient unable to bear weight on left foot entirely due to pain in left low back and left posterior hip.  Positive straight leg raise left lower extremity.   Neurological: She is alert and oriented to person, place, and time. She displays abnormal reflex ( Decreased DTRs left lower extremity at ankle). No sensory deficit. She exhibits normal muscle tone. Coordination normal.   Skin: Skin is warm and dry. No rash noted. She is not diaphoretic. No erythema.   Psychiatric: She has a normal mood and affect. Her behavior is normal. Judgment and thought content normal.   Nursing note and vitals reviewed.      Assessment/Plan     Diagnoses and all orders for this visit:    Left-sided low back pain with bilateral sciatica, unspecified chronicity  -     Ambulatory Referral to Physical Therapy Evaluate and treat    Anxiety  -      sertraline (ZOLOFT) 50 MG tablet; Take 1 tablet by mouth Daily.    Pain of left hip joint    Other orders  -     levothyroxine (SYNTHROID, LEVOTHROID) 25 MCG tablet; Take 1 tablet by mouth 1 (One) Time for 1 dose.    Prescription written for gabapentin 300 mg 1 twice a day #60 start with daily at bedtime for the first 3 nights then moved to twice a day for neuropathy and left lower extremity, start physical therapy, patient will follow-up for recheck after 6 sessions of physical therapy.  Discussed abuse potential gabapentin.  Refill Klonopin 0.5 mg one daily as needed for anxiety and panic attacks #30 with 5 refills also discussed abuse potential, follow-up in 6 months and as needed regarding anxiety disorder.

## 2018-08-10 ENCOUNTER — HOSPITAL ENCOUNTER (EMERGENCY)
Facility: HOSPITAL | Age: 28
Discharge: HOME | End: 2018-08-10
Attending: EMERGENCY MEDICINE | Admitting: EMERGENCY MEDICINE

## 2018-08-10 VITALS
BODY MASS INDEX: 38.36 KG/M2 | DIASTOLIC BLOOD PRESSURE: 80 MMHG | HEIGHT: 69 IN | RESPIRATION RATE: 18 BRPM | SYSTOLIC BLOOD PRESSURE: 148 MMHG | WEIGHT: 259 LBS | HEART RATE: 101 BPM | OXYGEN SATURATION: 99 % | TEMPERATURE: 98.6 F

## 2018-08-10 DIAGNOSIS — M54.16 LUMBAR RADICULOPATHY, ACUTE: Primary | ICD-10-CM

## 2018-08-10 PROCEDURE — 99283 EMERGENCY DEPT VISIT LOW MDM: CPT

## 2018-08-10 PROCEDURE — 25010000002 PROMETHAZINE PER 50 MG: Performed by: EMERGENCY MEDICINE

## 2018-08-10 PROCEDURE — 25010000002 HYDROMORPHONE PER 4 MG: Performed by: EMERGENCY MEDICINE

## 2018-08-10 PROCEDURE — 96372 THER/PROPH/DIAG INJ SC/IM: CPT

## 2018-08-10 RX ORDER — PREDNISONE 20 MG/1
TABLET ORAL
Qty: 18 TABLET | Refills: 0 | Status: SHIPPED | OUTPATIENT
Start: 2018-08-10 | End: 2018-09-05

## 2018-08-10 RX ORDER — CYCLOBENZAPRINE HCL 10 MG
10 TABLET ORAL 3 TIMES DAILY PRN
Qty: 12 TABLET | Refills: 0 | Status: SHIPPED | OUTPATIENT
Start: 2018-08-10 | End: 2018-10-16

## 2018-08-10 RX ORDER — PROMETHAZINE HYDROCHLORIDE 25 MG/ML
25 INJECTION, SOLUTION INTRAMUSCULAR; INTRAVENOUS ONCE
Status: COMPLETED | OUTPATIENT
Start: 2018-08-10 | End: 2018-08-10

## 2018-08-10 RX ADMIN — HYDROMORPHONE HYDROCHLORIDE 1 MG: 1 INJECTION, SOLUTION INTRAMUSCULAR; INTRAVENOUS; SUBCUTANEOUS at 13:31

## 2018-08-10 RX ADMIN — PROMETHAZINE HYDROCHLORIDE 25 MG: 25 INJECTION INTRAMUSCULAR; INTRAVENOUS at 13:31

## 2018-08-10 NOTE — ED PROVIDER NOTES
Subjective   Ms. Brenton Vila is a 28 y.o. female who presents to the ED with c/o lumbar back pain. She reports she has been experiencing this pain for 8 months. She states the pain was initially located in her left lumbar region and radiated to her left leg and now is located in her right lumbar region and radiates to her right leg. She reports the pain is severe and has been worsening. She denies bowel or urinary incontinence. She has taken Gabapentin and Ibuprofen without any relief. She has not taken any steroids or muscle relaxers. She saw a chiropractor for the first time 3 days ago and went to physical therapy 2 days ago. There are no other acute symptoms at this time.        History provided by:  Patient  Back Pain   Location:  Lumbar spine  Radiates to:  R thigh and L thigh  Pain severity:  Severe  Onset quality:  Gradual  Duration:  8 months  Timing:  Constant  Progression:  Worsening  Chronicity:  Chronic  Relieved by:  Nothing  Worsened by:  Nothing  Ineffective treatments: Gabapentin and Ibuprofen.  Associated symptoms: no bladder incontinence and no bowel incontinence    Risk factors: obesity        Review of Systems   Gastrointestinal: Negative for bowel incontinence.   Genitourinary: Negative for bladder incontinence.   Musculoskeletal: Positive for back pain.       Past Medical History:   Diagnosis Date   • History of PCOS 01/2015   • Hyperlipidemia 2015    Off meds ~ 2016   • Hypothyroid 02/2017   • Migraine        No Known Allergies    Past Surgical History:   Procedure Laterality Date   • ENDOSCOPY  06/2016    Removal bile duct stone   • LAPAROSCOPIC CHOLECYSTECTOMY  04/2016       No family history on file.    Social History     Social History   • Marital status:      Social History Main Topics   • Smoking status: Current Some Day Smoker     Packs/day: 0.50     Types: Cigarettes     Start date: 2007     Last attempt to quit: 7/27/2017   • Smokeless tobacco: Never Used   • Alcohol use No  "  • Drug use: No   • Sexual activity: Yes     Partners: Male     Other Topics Concern   • Not on file         Objective   Physical Exam   Constitutional: She is oriented to person, place, and time. She appears well-developed and well-nourished. No distress.   Patient is tearful.   HENT:   Head: Normocephalic and atraumatic.   Nose: Nose normal.   Eyes: Conjunctivae are normal. No scleral icterus.   Neck: Normal range of motion. Neck supple.   Cardiovascular:   No murmur heard.  Pulmonary/Chest: Effort normal. No respiratory distress.   Musculoskeletal: Normal range of motion. She exhibits no edema.   Neurological: She is alert and oriented to person, place, and time.   Reflex Scores:       Patellar reflexes are 2+ on the right side and 2+ on the left side.       Achilles reflexes are 2+ on the right side and 2+ on the left side.  Strength 5/5 in bilateral lower extermities.   Skin: Skin is warm and dry.   Psychiatric: She has a normal mood and affect. Her behavior is normal.   Nursing note and vitals reviewed.      Procedures         ED Course     No results found for this or any previous visit (from the past 24 hour(s)).  Note: In addition to lab results from this visit, the labs listed above may include labs taken at another facility or during a different encounter within the last 24 hours. Please correlate lab times with ED admission and discharge times for further clarification of the services performed during this visit.    No orders to display     Vitals:    08/10/18 1133 08/10/18 1344   BP: 164/86 148/80   BP Location: Left arm    Patient Position: Sitting    Pulse: 101    Resp: 18    Temp: 98.6 °F (37 °C)    TempSrc: Oral    SpO2: 98% 99%   Weight: 117 kg (259 lb)    Height: 175.3 cm (69\")      Medications   HYDROmorphone (DILAUDID) injection 1 mg (1 mg Intramuscular Given 8/10/18 1331)   promethazine (PHENERGAN) injection 25 mg (25 mg Intramuscular Given 8/10/18 1331)     ECG/EMG Results (last 24 hours)     " ** No results found for the last 24 hours. **                        MDM  Number of Diagnoses or Management Options  Lumbar radiculopathy, acute: new and requires workup     Amount and/or Complexity of Data Reviewed  Discuss the patient with other providers: yes    Patient Progress  Patient progress: stable      Final diagnoses:   Lumbar radiculopathy, acute       Documentation assistance provided by wilson Restrepo.  Information recorded by the scribe was done at my direction and has been verified and validated by me.     Praful Restrepo  08/10/18 1053       Rosalio Arevalo PA  08/10/18 9030

## 2018-08-13 ENCOUNTER — OFFICE VISIT (OUTPATIENT)
Dept: FAMILY MEDICINE CLINIC | Facility: CLINIC | Age: 28
End: 2018-08-13

## 2018-08-13 VITALS
OXYGEN SATURATION: 99 % | BODY MASS INDEX: 39.25 KG/M2 | WEIGHT: 265 LBS | HEART RATE: 89 BPM | SYSTOLIC BLOOD PRESSURE: 132 MMHG | HEIGHT: 69 IN | DIASTOLIC BLOOD PRESSURE: 70 MMHG | TEMPERATURE: 97.8 F

## 2018-08-13 DIAGNOSIS — M54.41 ACUTE BILATERAL LOW BACK PAIN WITH BILATERAL SCIATICA: Primary | ICD-10-CM

## 2018-08-13 DIAGNOSIS — M51.26 LUMBAR DISC HERNIATION: ICD-10-CM

## 2018-08-13 DIAGNOSIS — M54.42 ACUTE BILATERAL LOW BACK PAIN WITH BILATERAL SCIATICA: Primary | ICD-10-CM

## 2018-08-13 PROCEDURE — 99213 OFFICE O/P EST LOW 20 MIN: CPT | Performed by: PHYSICIAN ASSISTANT

## 2018-08-13 RX ORDER — HYDROCODONE BITARTRATE AND ACETAMINOPHEN 7.5; 325 MG/1; MG/1
TABLET ORAL
COMMUNITY
Start: 2018-08-11 | End: 2018-09-05

## 2018-08-13 RX ORDER — GABAPENTIN 300 MG/1
CAPSULE ORAL
COMMUNITY
Start: 2018-08-09 | End: 2018-10-22 | Stop reason: SDUPTHER

## 2018-08-13 NOTE — PROGRESS NOTES
Subjective   Brenton Vila is a 28 y.o. female  Back Pain (Follow up from [facility] ER visit for back pain 08/11/2018)      History of Present Illness  Patient comes in today for evaluation of worsening low back pain.  Please see previous office notes.  Following her last office visit here she did attend physical therapy for one visit.  She states that her back pain worsened after that visit she began to experience weakening and pain radiating down both legs to the point where she could not walk at times.  She went to the [facility] ER states she was given a shot dye lauded which did not help her pain or her inability to walk so she went to [facility] ER as well.  She states she had a CT scan of her back done at [facility] over the weekend which showed herniated disc in the L4-L5 region.  She is following up today for further evaluation.  She states she finds it difficult to stand for prolonged periods of time, pain and she is down both of her legs and she has weakness of both her legs as well.  She states pain is a 9 out of 10 when it is most severe in a 7 out of 10 when it is best.  The following portions of the patient's history were reviewed and updated as appropriate: allergies, current medications, past social history and problem list    Review of Systems   Constitutional: Negative for fever.   Cardiovascular: Negative for chest pain.   Gastrointestinal: Negative for abdominal pain.   Genitourinary: Negative.  Negative for dysuria.   Musculoskeletal: Positive for back pain, gait problem and myalgias.   Neurological: Positive for weakness and headaches. Negative for numbness.       Objective     Vitals:    08/13/18 0815   BP: 132/70   Pulse: 89   Temp: 97.8 °F (36.6 °C)   SpO2: 99%       Physical Exam   Constitutional: She is oriented to person, place, and time. She appears well-developed and well-nourished.   Cardiovascular: Normal rate and regular rhythm.    Pulmonary/Chest: Effort normal and breath sounds normal.   Abdominal:  Soft. Bowel sounds are normal.   Musculoskeletal: She exhibits tenderness.        Lumbar back: She exhibits decreased range of motion, tenderness, bony tenderness and pain. She exhibits no swelling, no deformity and no spasm.   Gait is antalgic, patient having difficult to walking or climbing up on exam table without assistance.  Patient prefers to lie facedown in prone position on exam table.   Neurological: She is alert and oriented to person, place, and time. She has normal reflexes.   Nursing note and vitals reviewed.      Assessment/Plan     Diagnoses and all orders for this visit:    Acute bilateral low back pain with bilateral sciatica  -     Ambulatory Referral to Neurosurgery    Lumbar disc herniation  -     Ambulatory Referral to Neurosurgery    Other orders  -     HYDROcodone-acetaminophen (NORCO) 7.5-325 MG per tablet;   -     gabapentin (NEURONTIN) 300 MG capsule;     Norco 10/325 one every 4 hours as needed for severe back pain over 30 with no refills discussed short-term usage of this medication and controlled substance status.

## 2018-08-14 ENCOUNTER — TELEPHONE (OUTPATIENT)
Dept: FAMILY MEDICINE CLINIC | Facility: CLINIC | Age: 28
End: 2018-08-14

## 2018-08-14 NOTE — TELEPHONE ENCOUNTER
----- Message from Brenton Vila sent at 8/14/2018 12:44 PM EDT -----  Regarding: Referral Request  Contact: [phone]  I got the call from Dr Palacio office and they won't see me without a MRI. So I deferrered the referral and am going to go to Dr. Tilley at [facility].

## 2018-08-16 ENCOUNTER — LAB (OUTPATIENT)
Dept: LAB | Facility: HOSPITAL | Age: 28
End: 2018-08-16

## 2018-08-16 ENCOUNTER — TELEPHONE (OUTPATIENT)
Dept: OBSTETRICS AND GYNECOLOGY | Facility: CLINIC | Age: 28
End: 2018-08-16

## 2018-08-16 DIAGNOSIS — N92.6 IRREGULAR MENSES: ICD-10-CM

## 2018-08-16 LAB
HCG INTACT+B SERPL-ACNC: <5 MIU/ML
PROGEST SERPL-MCNC: 4.87 NG/ML

## 2018-08-16 PROCEDURE — 84702 CHORIONIC GONADOTROPIN TEST: CPT | Performed by: OBSTETRICS & GYNECOLOGY

## 2018-08-16 PROCEDURE — 84144 ASSAY OF PROGESTERONE: CPT

## 2018-08-16 PROCEDURE — 36415 COLL VENOUS BLD VENIPUNCTURE: CPT | Performed by: OBSTETRICS & GYNECOLOGY

## 2018-08-16 NOTE — TELEPHONE ENCOUNTER
Let's get her to come back in about 5 days and repeat her level.  Would let her know that so far I think this dose of Clomid seems to be working just like we want it to

## 2018-08-16 NOTE — TELEPHONE ENCOUNTER
----- Message from Marry Rowan RN sent at 8/16/2018 10:03 AM EDT -----  Today is day 22 and she is not supplementing progesterone.     ----- Message -----  From: Johny Barakat MD  Sent: 8/16/2018   9:15 AM  To: [facility] Clinical [facility]    Please call Brenton regarding her test results.  Her progesterone is elevated slightly.  Can you find out what day of the cycle she is and confirm she is not taking supplemental progesterone?

## 2018-08-20 ENCOUNTER — OFFICE VISIT (OUTPATIENT)
Dept: FAMILY MEDICINE CLINIC | Facility: CLINIC | Age: 28
End: 2018-08-20

## 2018-08-20 VITALS
HEIGHT: 69 IN | TEMPERATURE: 98.3 F | DIASTOLIC BLOOD PRESSURE: 86 MMHG | OXYGEN SATURATION: 99 % | BODY MASS INDEX: 38.36 KG/M2 | WEIGHT: 259 LBS | SYSTOLIC BLOOD PRESSURE: 142 MMHG | HEART RATE: 111 BPM

## 2018-08-20 DIAGNOSIS — M54.42 ACUTE BILATERAL LOW BACK PAIN WITH BILATERAL SCIATICA: Primary | ICD-10-CM

## 2018-08-20 DIAGNOSIS — M54.41 ACUTE BILATERAL LOW BACK PAIN WITH BILATERAL SCIATICA: Primary | ICD-10-CM

## 2018-08-20 PROCEDURE — 99213 OFFICE O/P EST LOW 20 MIN: CPT | Performed by: PHYSICIAN ASSISTANT

## 2018-08-20 RX ORDER — HYDROCODONE BITARTRATE AND ACETAMINOPHEN 10; 325 MG/1; MG/1
TABLET ORAL
Refills: 0 | COMMUNITY
Start: 2018-08-13 | End: 2018-10-16

## 2018-08-20 NOTE — PROGRESS NOTES
Subjective   Brenton Vila is a 28 y.o. female  Back Pain (Follow up on back pain, req a refill on Norco )      History of Present Illness  Patient comes in today for follow-up on chronic back pain with acute flare with radiculopathy.  Please see previous office notes.  Patient has been seen by Dr. Tilley in neurosurgery [facility] last Thursday as scheduled to have an epidural spinal injection at [facility] on September 6 for degenerative disc disease with spinal stenosis.  She states that she has been able to work with the addition of Norco.  She states that keeping her pain at a tolerable level although she is in constant pain in her low back going down her right leg and experiencing numbness in her feet at times she denies ever since effects from [place].  She is due for refill.  The following portions of the patient's history were reviewed and updated as appropriate: allergies, current medications, past social history and problem list    Review of Systems   Respiratory: Negative.    Gastrointestinal: Negative.    Genitourinary: Negative.    Musculoskeletal: Positive for back pain and gait problem. Negative for arthralgias and myalgias.   Neurological: Positive for weakness and numbness. Negative for dizziness and tremors.       Objective     Vitals:    08/20/18 1430   BP: 142/86   Pulse: 111   Temp: 98.3 °F (36.8 °C)   SpO2: 99%       Physical Exam   Constitutional: She is oriented to person, place, and time. She appears well-developed and well-nourished.   Cardiovascular: Normal rate and regular rhythm.    Pulmonary/Chest: Effort normal and breath sounds normal.   Abdominal: Soft. Bowel sounds are normal.   Musculoskeletal:        Lumbar back: She exhibits decreased range of motion, tenderness, bony tenderness and pain. She exhibits no swelling, no deformity and no spasm.   Neurological: She is alert and oriented to person, place, and time. She has normal reflexes.   Nursing note and vitals  reviewed.      Assessment/Plan     Diagnoses and all orders for this visit:    Acute bilateral low back pain with bilateral sciatica    Other orders  -     HYDROcodone-acetaminophen (NORCO)  MG per tablet; 1 T PO Q 4 H PRF BACK PAIN    Refilled Norco 10/325 one every 4 hours as needed for severe back pain #90, patient will follow-up with neurosurgery as scheduled on September 6 in follow-up with us as needed.  Discussed abuse potential this medication attention was given a short-term medication.  She states she has experienced some sweating with gabapentin and will try to discontinue it at this time.

## 2018-08-21 ENCOUNTER — LAB (OUTPATIENT)
Dept: LAB | Facility: HOSPITAL | Age: 28
End: 2018-08-21

## 2018-08-21 DIAGNOSIS — N92.6 IRREGULAR MENSES: ICD-10-CM

## 2018-08-21 LAB — PROGEST SERPL-MCNC: 18.23 NG/ML

## 2018-08-21 PROCEDURE — 36415 COLL VENOUS BLD VENIPUNCTURE: CPT

## 2018-08-21 PROCEDURE — 84144 ASSAY OF PROGESTERONE: CPT

## 2018-09-05 ENCOUNTER — OFFICE VISIT (OUTPATIENT)
Dept: FAMILY MEDICINE CLINIC | Facility: CLINIC | Age: 28
End: 2018-09-05

## 2018-09-05 ENCOUNTER — TELEPHONE (OUTPATIENT)
Dept: FAMILY MEDICINE CLINIC | Facility: CLINIC | Age: 28
End: 2018-09-05

## 2018-09-05 VITALS
OXYGEN SATURATION: 99 % | HEART RATE: 76 BPM | BODY MASS INDEX: 38.36 KG/M2 | DIASTOLIC BLOOD PRESSURE: 80 MMHG | TEMPERATURE: 98.4 F | HEIGHT: 69 IN | SYSTOLIC BLOOD PRESSURE: 130 MMHG | WEIGHT: 259 LBS

## 2018-09-05 DIAGNOSIS — M54.42 LEFT-SIDED LOW BACK PAIN WITH BILATERAL SCIATICA, UNSPECIFIED CHRONICITY: Primary | ICD-10-CM

## 2018-09-05 DIAGNOSIS — M79.672 FOOT PAIN, LEFT: ICD-10-CM

## 2018-09-05 DIAGNOSIS — M54.41 LEFT-SIDED LOW BACK PAIN WITH BILATERAL SCIATICA, UNSPECIFIED CHRONICITY: Primary | ICD-10-CM

## 2018-09-05 PROCEDURE — 99213 OFFICE O/P EST LOW 20 MIN: CPT | Performed by: PHYSICIAN ASSISTANT

## 2018-09-05 NOTE — TELEPHONE ENCOUNTER
----- Message from Annetta Nix sent at 9/5/2018  8:52 AM EDT -----  Contact: PATIENT  RX TOLD HER SHE WILL NEED A PA ON THE [place] THAT WAS PRESCRIBED TO HER THIS MORNING OR SHE WILL HAVE TO WAIT UNTIL THE 15TH TO GET IT.     [facility]

## 2018-09-05 NOTE — PROGRESS NOTES
Subjective   Brenton Vila is a 28 y.o. female  Toe Pain (Broken left great toe over the weekend,seen at [facility] ) and Back Pain (Follow up on back pain, req refill on Norco )      History of Present Illness  Patient comes in today for follow-up on chronic back pain, she is in process of getting an MRI this week and following up with neurosurgery and also has an appointment with pain management for injections but states that the first appointment is for consultation.  She still having excruciating pain in her low back going down her leg she states she has to work to make a living but cannot even get out of bed without pain medication.  She states with the Norco 4 times daily she is able to function and sleep.  She also fell and broke her great toe on her left foot over the weekend tripping a concert.  She states this is doing better.  The following portions of the patient's history were reviewed and updated as appropriate: allergies, current medications, past social history and problem list    Review of Systems   Constitutional: Positive for activity change.   Musculoskeletal: Positive for arthralgias, back pain, gait problem and myalgias. Negative for joint swelling.   Skin: Negative.    Neurological: Positive for numbness. Negative for weakness.   Psychiatric/Behavioral: Positive for sleep disturbance.       Objective     Vitals:    09/05/18 0809   BP: 130/80   Pulse: 76   Temp: 98.4 °F (36.9 °C)   SpO2: 99%       Physical Exam   Constitutional: She is oriented to person, place, and time. She appears well-developed and well-nourished.   Cardiovascular: Normal rate and regular rhythm.    Pulmonary/Chest: Effort normal and breath sounds normal.   Abdominal: Soft. Bowel sounds are normal.   Musculoskeletal: She exhibits tenderness ( Tenderness and bruising left great toe) and deformity. She exhibits no edema.        Lumbar back: She exhibits decreased range of motion, tenderness, bony tenderness and pain. She  exhibits no swelling, no deformity and no spasm.   Neurological: She is alert and oriented to person, place, and time. She has normal reflexes. She exhibits normal muscle tone. Coordination normal.   Skin: Skin is warm and dry. No rash noted. No erythema. No pallor.   Psychiatric: She has a normal mood and affect. Her behavior is normal. Judgment and thought content normal.   Nursing note and vitals reviewed.      Assessment/Plan     Diagnoses and all orders for this visit:    Left-sided low back pain with bilateral sciatica, unspecified chronicity    Foot pain, left    Due to patient's ongoing persistent back pain while trying to get evaluation and treatment by neurosurgery and chronic pain management and her need to be able to take pain medication to function carry out ADLs I have refilled her Norco 10/325 one 4 times daily as needed for severe back pain #120.  Andrew reviewed, appropriate.  I discussed with patient the concern regarding both psychological and physical dependence on this medication and the risks involved.  Discussed with her the need to gradually wean her off this medication as her pain is controlled through more permanent solution of either upcoming surgery or pain management injections.  She is agreement to this and expresses understanding of the addictive nature of this medication and agrees with the need to use it for short-term as possible.  She'll follow-up with me in one month for recheck.  She'll continue with firm shoe for left foot, but he tape and toe and follow-up if symptoms worsen regarding foot pain.

## 2018-09-19 ENCOUNTER — TELEPHONE (OUTPATIENT)
Dept: FAMILY MEDICINE CLINIC | Facility: CLINIC | Age: 28
End: 2018-09-19

## 2018-09-19 NOTE — TELEPHONE ENCOUNTER
----- Message from Anastasiia Ambrocio sent at 9/19/2018  4:20 PM EDT -----  Contact: PT  WANTS TO KNOW IF SHE CAN START TAKING THE TOPAMAX THAT SHE WAS GIVEN FOR HEADACHES ALONG WITH THE MEDS SHE IS ON    PLEASE CALL PT

## 2018-09-27 ENCOUNTER — OFFICE VISIT (OUTPATIENT)
Dept: FAMILY MEDICINE CLINIC | Facility: CLINIC | Age: 28
End: 2018-09-27

## 2018-09-27 VITALS
BODY MASS INDEX: 38.8 KG/M2 | DIASTOLIC BLOOD PRESSURE: 64 MMHG | WEIGHT: 262 LBS | SYSTOLIC BLOOD PRESSURE: 116 MMHG | HEIGHT: 69 IN | HEART RATE: 105 BPM | TEMPERATURE: 98.1 F | OXYGEN SATURATION: 99 %

## 2018-09-27 DIAGNOSIS — M54.42 LEFT-SIDED LOW BACK PAIN WITH BILATERAL SCIATICA, UNSPECIFIED CHRONICITY: Primary | ICD-10-CM

## 2018-09-27 DIAGNOSIS — J06.9 URI, ACUTE: ICD-10-CM

## 2018-09-27 DIAGNOSIS — K08.89 PAIN, DENTAL: ICD-10-CM

## 2018-09-27 DIAGNOSIS — M54.41 LEFT-SIDED LOW BACK PAIN WITH BILATERAL SCIATICA, UNSPECIFIED CHRONICITY: Primary | ICD-10-CM

## 2018-09-27 DIAGNOSIS — M51.26 LUMBAR DISC HERNIATION: ICD-10-CM

## 2018-09-27 PROCEDURE — 99214 OFFICE O/P EST MOD 30 MIN: CPT | Performed by: PHYSICIAN ASSISTANT

## 2018-09-27 RX ORDER — PREDNISONE 20 MG/1
TABLET ORAL
Qty: 6 TABLET | Refills: 0 | Status: SHIPPED | OUTPATIENT
Start: 2018-09-27 | End: 2018-10-30

## 2018-09-27 RX ORDER — AZITHROMYCIN 250 MG/1
TABLET, FILM COATED ORAL
Qty: 6 TABLET | Refills: 0 | Status: SHIPPED | OUTPATIENT
Start: 2018-09-27 | End: 2018-10-16

## 2018-09-27 RX ORDER — LEVOTHYROXINE SODIUM 0.03 MG/1
TABLET ORAL
COMMUNITY
Start: 2018-09-09 | End: 2019-01-29 | Stop reason: SDUPTHER

## 2018-09-27 RX ORDER — DEXTROMETHORPHAN HYDROBROMIDE AND PROMETHAZINE HYDROCHLORIDE 15; 6.25 MG/5ML; MG/5ML
5 SYRUP ORAL 4 TIMES DAILY PRN
Qty: 120 ML | Refills: 0 | Status: SHIPPED | OUTPATIENT
Start: 2018-09-27 | End: 2018-10-16

## 2018-09-27 NOTE — PROGRESS NOTES
Subjective   Brenton Vila is a 28 y.o. female  Back Pain (follow up on back pain, req lower dose); Cough (dry cough since this morning ); and Oral Pain (oral pain x2 weeks )      History of Present Illness  Patient's here for 3 separate issues one is for follow-up on chronic low back pain associated with disc herniation.  An MRI of her lumbar spine on 5 September showing a disc herniation L4 to L5 and had a spinal injection yesterday with Dr. Tilley at [facility].  She states she was told it might take 14 days to see effect.  She states that her back pain is about the same she has been coughing the past 2 days which is worsening her back pain.  Her cough is productive of cloudy sputum, she'll let a postnasal drainage and sore throat and chest congestion.  No fever.  Also she's noticed some soreness in her mouth around the lower jaw where she had a tooth extracted last month.  She is a smoker.  The following portions of the patient's history were reviewed and updated as appropriate: allergies, current medications, past social history and problem list    Review of Systems   Constitutional: Negative.  Negative for chills, fatigue and fever.   HENT: Positive for congestion and dental problem.    Respiratory: Positive for cough. Negative for shortness of breath.    Cardiovascular: Negative for chest pain.   Gastrointestinal: Negative.  Negative for nausea.   Genitourinary: Negative.    Musculoskeletal: Positive for back pain. Negative for arthralgias, gait problem and myalgias.   Skin: Negative.    Neurological: Negative for dizziness, tremors, weakness and numbness.   Psychiatric/Behavioral: Negative.        Objective     Vitals:    09/27/18 1501   BP: 116/64   Pulse: 105   Temp: 98.1 °F (36.7 °C)   SpO2: 99%       Physical Exam   Constitutional: She is oriented to person, place, and time. She appears well-developed and well-nourished. No distress.   HENT:   Head: Normocephalic and atraumatic.   Right Ear: Tympanic  membrane and ear canal normal.   Left Ear: Tympanic membrane and ear canal normal.   Nose: Mucosal edema, rhinorrhea and sinus tenderness present. Right sinus exhibits maxillary sinus tenderness and frontal sinus tenderness. Left sinus exhibits maxillary sinus tenderness and frontal sinus tenderness.   Mouth/Throat: Oropharynx is clear and moist. She has dentures. Normal dentition. No oropharyngeal exudate.   Eyes: Pupils are equal, round, and reactive to light.   Cardiovascular: Normal rate and regular rhythm.    Pulmonary/Chest: Effort normal and breath sounds normal.   Abdominal: Soft. Bowel sounds are normal.   Musculoskeletal:        Lumbar back: She exhibits decreased range of motion, tenderness, bony tenderness and pain. She exhibits no swelling, no deformity and no spasm.   Neurological: She is alert and oriented to person, place, and time. She has normal reflexes.   Skin: She is not diaphoretic.   Psychiatric: She has a normal mood and affect. Her behavior is normal. Judgment and thought content normal.   Nursing note and vitals reviewed.      Assessment/Plan     Diagnoses and all orders for this visit:    Left-sided low back pain with bilateral sciatica, unspecified chronicity    Lumbar disc herniation    URI, acute    Pain, dental    Other orders  -     levothyroxine (SYNTHROID, LEVOTHROID) 25 MCG tablet;   -     azithromycin (ZITHROMAX Z-ELIZABETH) 250 MG tablet; Take 2 tablets the first day, then 1 tablet daily for 4 days.  -     promethazine-dextromethorphan (PROMETHAZINE-DM) 6.25-15 MG/5ML syrup; Take 5 mL by mouth 4 (Four) Times a Day As Needed for Cough.  -     predniSONE (DELTASONE) 20 MG tablet; Take one twice daily for sinus     I discussed with patient that I want her to start decreasing her dosage of hydrocodone new prescription written for Norco 7.5/325 to take no more than 4 times daily before meals down from 5 times daily from [place].  Quantity 120 given to last for 4 weeks.  She will continue  following up Dr. Tilley for recheck and if needed repeated spinal injections.,  She'll continue attempting to reduce her cigarette smoking.  Follow-up in 4 weeks and as needed.  She will follow-up with her dentist regarding dental pain following recent tooth extraction, no if this for abscess seen.

## 2018-09-28 ENCOUNTER — PATIENT MESSAGE (OUTPATIENT)
Dept: OBSTETRICS AND GYNECOLOGY | Facility: CLINIC | Age: 28
End: 2018-09-28

## 2018-10-02 DIAGNOSIS — N91.4 SECONDARY OLIGOMENORRHEA: Primary | ICD-10-CM

## 2018-10-02 RX ORDER — MEDROXYPROGESTERONE ACETATE 10 MG/1
10 TABLET ORAL DAILY
Qty: 10 TABLET | Refills: 0 | Status: SHIPPED | OUTPATIENT
Start: 2018-10-02 | End: 2018-10-12

## 2018-10-03 ENCOUNTER — LAB (OUTPATIENT)
Dept: LAB | Facility: HOSPITAL | Age: 28
End: 2018-10-03

## 2018-10-03 DIAGNOSIS — N91.4 SECONDARY OLIGOMENORRHEA: ICD-10-CM

## 2018-10-03 LAB — HCG INTACT+B SERPL-ACNC: 5 MIU/ML

## 2018-10-03 PROCEDURE — 84702 CHORIONIC GONADOTROPIN TEST: CPT

## 2018-10-03 PROCEDURE — 36415 COLL VENOUS BLD VENIPUNCTURE: CPT

## 2018-10-14 RX ORDER — GABAPENTIN 300 MG/1
CAPSULE ORAL
Qty: 60 CAPSULE | Refills: 1 | Status: CANCELLED | OUTPATIENT
Start: 2018-10-14

## 2018-10-16 ENCOUNTER — OFFICE VISIT (OUTPATIENT)
Dept: FAMILY MEDICINE CLINIC | Facility: CLINIC | Age: 28
End: 2018-10-16

## 2018-10-16 VITALS
HEIGHT: 69 IN | DIASTOLIC BLOOD PRESSURE: 86 MMHG | WEIGHT: 262 LBS | SYSTOLIC BLOOD PRESSURE: 126 MMHG | BODY MASS INDEX: 38.8 KG/M2 | OXYGEN SATURATION: 100 % | HEART RATE: 88 BPM | TEMPERATURE: 97.9 F

## 2018-10-16 DIAGNOSIS — M54.42 LEFT-SIDED LOW BACK PAIN WITH BILATERAL SCIATICA, UNSPECIFIED CHRONICITY: ICD-10-CM

## 2018-10-16 DIAGNOSIS — M54.17 LUMBOSACRAL RADICULOPATHY: ICD-10-CM

## 2018-10-16 DIAGNOSIS — M54.41 LEFT-SIDED LOW BACK PAIN WITH BILATERAL SCIATICA, UNSPECIFIED CHRONICITY: ICD-10-CM

## 2018-10-16 DIAGNOSIS — E66.9 OBESITY (BMI 30-39.9): Primary | ICD-10-CM

## 2018-10-16 PROCEDURE — 99214 OFFICE O/P EST MOD 30 MIN: CPT | Performed by: PHYSICIAN ASSISTANT

## 2018-10-16 RX ORDER — RIFAXIMIN 550 MG/1
TABLET ORAL
COMMUNITY
Start: 2018-10-08 | End: 2018-10-30

## 2018-10-16 NOTE — PROGRESS NOTES
Subjective   Brenton Vila is a 28 y.o. female  Obesity (follow up on weight, wants to discuss options) and Peripheral Neuropathy (req refills on Gabapentin )      History of Present Illness  Patient comes in today for follow-up on obesity as well as peripheral neuropathy in lower extremity associated with degenerative disc disease of low back.  She states that her back pain is improving after recent injections and she would like to try coming off of her Norco.  She states she still has some pain on daily basis but is getting better each day.  She has reduced her gabapentin down to once at night time and has been able to notice significant improvement in her neuropathy in her leg.  She is struggling with her weight and knows this is adding strained her back.  She's trying to follow a low-carb high-protein diet but struggling with her appetite.  She's done well the past on phentermine and is currently still on Topamax.  The following portions of the patient's history were reviewed and updated as appropriate: allergies, current medications, past social history and problem list    Review of Systems   Constitutional: Positive for activity change and appetite change. Negative for unexpected weight change.   Cardiovascular: Negative for chest pain.   Gastrointestinal: Negative for abdominal distention, abdominal pain, diarrhea and nausea.   Musculoskeletal: Positive for back pain and myalgias.   Neurological: Positive for numbness.   Psychiatric/Behavioral: Negative for dysphoric mood. The patient is not nervous/anxious.        Objective     Vitals:    10/16/18 1457   BP: 126/86   Pulse: 88   Temp: 97.9 °F (36.6 °C)   SpO2: 100%       Physical Exam   Constitutional: She is oriented to person, place, and time. She appears well-developed and well-nourished. No distress.   Obesity noted     Neck: No thyromegaly present.   Cardiovascular: Normal rate and regular rhythm.    Pulmonary/Chest: Effort normal and breath sounds  normal.   Abdominal: Soft. Bowel sounds are normal. There is no tenderness.   Musculoskeletal:        Lumbar back: She exhibits decreased range of motion, tenderness, bony tenderness and pain. She exhibits no swelling, no deformity and no spasm.   Neurological: She is alert and oriented to person, place, and time. She has normal reflexes.   Skin: She is not diaphoretic.   Psychiatric: She has a normal mood and affect. Her behavior is normal. Judgment and thought content normal.   Nursing note and vitals reviewed.      Assessment/Plan     Diagnoses and all orders for this visit:    Obesity (BMI 30-39.9)    Left-sided low back pain with bilateral sciatica, unspecified chronicity    Lumbosacral radiculopathy    Other orders  -     XIFAXAN 550 MG tablet; tid     refilled phentermine 37.5 mg tablets one daily to assist in weight loss #30 with one refill discussed abuse potential encouraged patient continue on Topamax and stay on a strict low-carb high-protein low calorie diet with adequate water intake and follow-up in 2 months for recheck.  Discussed recommendation to wean patient off of opioid pain medication over the next 2 weeks reduce down to Norco 5/325 one daily for the next 7 days and half a day for the next 7 days then discontinue.  Discussed abuse potential this medication and need to continue it and not use for long-term.  Patient is in agreement to this.  Refilled gabapentin reduced dosage of 300 mg daily at bedtime #30 with 5 refills, follow-up in 2 months for recheck.  [place] reviewed, appropriate.

## 2018-10-22 RX ORDER — GABAPENTIN 300 MG/1
300 CAPSULE ORAL 3 TIMES DAILY
Qty: 90 CAPSULE | Refills: 5 | OUTPATIENT
Start: 2018-10-22 | End: 2019-07-03

## 2018-10-22 NOTE — TELEPHONE ENCOUNTER
Called in a new RX to [facility] pharmacy and notified patient in [facility]    Fwd To Dr. KERR to sign off

## 2018-10-22 NOTE — TELEPHONE ENCOUNTER
----- Message from Brenton Vila sent at 10/22/2018  8:04 AM EDT -----  Regarding: Non-Urgent Medical Question  Contact: [phone]  Mahnaz,  What on earth do I Do? The steroid shot has no effect anymore and I am hurting worse than before I had it. I don't have an appointment with pain management for a followup until next Tuesday and the neurosurgeon is booked till after that. Any suggestions or other options I have been on a heating pad all weekend.

## 2018-10-26 ENCOUNTER — LAB (OUTPATIENT)
Dept: LAB | Facility: HOSPITAL | Age: 28
End: 2018-10-26

## 2018-10-26 DIAGNOSIS — N91.4 SECONDARY OLIGOMENORRHEA: ICD-10-CM

## 2018-10-26 LAB — PROGEST SERPL-MCNC: 6.59 NG/ML

## 2018-10-26 PROCEDURE — 84144 ASSAY OF PROGESTERONE: CPT

## 2018-10-26 PROCEDURE — 36415 COLL VENOUS BLD VENIPUNCTURE: CPT

## 2018-10-29 ENCOUNTER — TELEPHONE (OUTPATIENT)
Dept: FAMILY MEDICINE CLINIC | Facility: CLINIC | Age: 28
End: 2018-10-29

## 2018-10-29 NOTE — TELEPHONE ENCOUNTER
"----- Message from Anastasiia Ambrocio sent at 10/29/2018  8:07 AM EDT -----  Contact: PT  REQUESTING CALL BACK ASAP. WAS SEEN IN ER YESTERDAY FOR BACK PAIN, HAS \"A SHOT\" SCHEDULED FOR [facility]. SHE HAS APPT NOW SCHED WITH [facility] TOMORROW MORNING (FIRST AVAILABLE) BUT NEEDS TO TALK TO HER TO HER OR [facility]NY. THEY WANT HER TO GET A 2ND OPINION FROM PCP BEFORE DECIDING.   "

## 2018-10-29 NOTE — TELEPHONE ENCOUNTER
Spoke to patient, she has some questions about possible increasing medication, she was seen at  ER yesterday. Patient has appt tomorrow morning. I have already called  for the records

## 2018-10-30 ENCOUNTER — OFFICE VISIT (OUTPATIENT)
Dept: FAMILY MEDICINE CLINIC | Facility: CLINIC | Age: 28
End: 2018-10-30

## 2018-10-30 VITALS
HEART RATE: 86 BPM | WEIGHT: 256 LBS | BODY MASS INDEX: 37.92 KG/M2 | SYSTOLIC BLOOD PRESSURE: 134 MMHG | HEIGHT: 69 IN | TEMPERATURE: 97.5 F | DIASTOLIC BLOOD PRESSURE: 92 MMHG | OXYGEN SATURATION: 99 %

## 2018-10-30 DIAGNOSIS — M54.42 ACUTE LEFT-SIDED LOW BACK PAIN WITH LEFT-SIDED SCIATICA: Primary | ICD-10-CM

## 2018-10-30 PROCEDURE — 99213 OFFICE O/P EST LOW 20 MIN: CPT | Performed by: PHYSICIAN ASSISTANT

## 2018-10-30 NOTE — PROGRESS NOTES
Subjective   Brenton Vila is a 28 y.o. female  Back Pain (Follow up at  ED from 10/28/2018 for back pain )      History of Present Illness  Patient comes in today for evaluation treatment of acute flareup of low back pain.  Patient was diagnosed with a herniated disc in her L4-L5 region, patient is scheduled to have a spinal injection tomorrow but started having increased intense pain in her left leg over the weekend and had to go to the ER.  She has been told not to take anti-inflammatories the week before her injection by her pain management specialist.  She states the pain is back to 10 out of 10 and going down her entire left leg to her calf and her shin on her left leg.  She states that she is taking gabapentin 3 times a day and still excruciating pain.  She is also been told by her pain management specialist that when she gets injection will take about 2 weeks for it to actually take effect.  She is still going to work but states she is in pain constantly.  She states she cannot raise the dose of gabapentin higher because it makes her feel drowsy if she does.  The following portions of the patient's history were reviewed and updated as appropriate: allergies, current medications, past social history and problem list    Review of Systems   Respiratory: Negative.    Gastrointestinal: Negative.    Genitourinary: Negative.    Musculoskeletal: Positive for back pain, gait problem and myalgias. Negative for arthralgias.   Neurological: Positive for weakness and numbness. Negative for dizziness and tremors.   Psychiatric/Behavioral: Positive for sleep disturbance.       Objective     Vitals:    10/30/18 0807   BP: 134/92   Pulse: 86   Temp: 97.5 °F (36.4 °C)   SpO2: 99%       Physical Exam   Constitutional: She is oriented to person, place, and time. She appears well-developed and well-nourished. No distress.   Cardiovascular: Normal rate and regular rhythm.    Pulmonary/Chest: Effort normal and breath sounds  normal.   Abdominal: Soft. Bowel sounds are normal.   Musculoskeletal: She exhibits tenderness ( Pain in left leg, patient is uncomfortable seated, shows evidence of difficulty getting comfortable, gait is antalgic favoring left lower extremity). She exhibits no edema or deformity.        Lumbar back: She exhibits decreased range of motion, tenderness, bony tenderness and pain. She exhibits no swelling, no deformity and no spasm.   Neurological: She is alert and oriented to person, place, and time. She displays abnormal reflex. A sensory deficit is present. No cranial nerve deficit. Coordination normal.   Skin: Skin is warm and dry. She is not diaphoretic. No erythema.   Nursing note and vitals reviewed.      Assessment/Plan     Diagnoses and all orders for this visit:    Acute left-sided low back pain with left-sided sciatica    Patient will keep her appointment with pain management for spinal injection tomorrow, refill given of Norco 10/325 one every 4 hours as needed for severe back and leg pain associated with disc herniation discussed the risk of addiction with this medication and need to use it only as needed and then wean patient off, she will use every 4 hours for the next week and then will decrease down to every 6 hours and then the third week down to every 8-12 hours.  She'll follow-up with me in one month.

## 2018-11-09 ENCOUNTER — OFFICE VISIT (OUTPATIENT)
Dept: OBSTETRICS AND GYNECOLOGY | Facility: CLINIC | Age: 28
End: 2018-11-09

## 2018-11-09 VITALS
SYSTOLIC BLOOD PRESSURE: 128 MMHG | WEIGHT: 257 LBS | DIASTOLIC BLOOD PRESSURE: 84 MMHG | RESPIRATION RATE: 14 BRPM | BODY MASS INDEX: 37.95 KG/M2

## 2018-11-09 DIAGNOSIS — Z01.419 WELL WOMAN EXAM: Primary | ICD-10-CM

## 2018-11-09 DIAGNOSIS — N87.0 MILD DYSPLASIA OF CERVIX (CIN I): ICD-10-CM

## 2018-11-09 PROBLEM — F17.200 SMOKER: Status: ACTIVE | Noted: 2018-11-09

## 2018-11-09 PROCEDURE — 99395 PREV VISIT EST AGE 18-39: CPT | Performed by: OBSTETRICS & GYNECOLOGY

## 2018-11-09 RX ORDER — HYDROCODONE BITARTRATE AND ACETAMINOPHEN 10; 325 MG/1; MG/1
TABLET ORAL EVERY 6 HOURS PRN
COMMUNITY
End: 2018-12-10

## 2018-11-15 DIAGNOSIS — N92.6 IRREGULAR MENSES: Primary | ICD-10-CM

## 2018-11-16 ENCOUNTER — LAB (OUTPATIENT)
Dept: LAB | Facility: HOSPITAL | Age: 28
End: 2018-11-16

## 2018-11-16 ENCOUNTER — OFFICE VISIT (OUTPATIENT)
Dept: FAMILY MEDICINE CLINIC | Facility: CLINIC | Age: 28
End: 2018-11-16

## 2018-11-16 ENCOUNTER — TELEPHONE (OUTPATIENT)
Dept: OBSTETRICS AND GYNECOLOGY | Facility: CLINIC | Age: 28
End: 2018-11-16

## 2018-11-16 VITALS
OXYGEN SATURATION: 99 % | BODY MASS INDEX: 38.51 KG/M2 | TEMPERATURE: 98.4 F | HEART RATE: 80 BPM | SYSTOLIC BLOOD PRESSURE: 142 MMHG | HEIGHT: 69 IN | WEIGHT: 260 LBS | DIASTOLIC BLOOD PRESSURE: 88 MMHG

## 2018-11-16 DIAGNOSIS — N92.6 IRREGULAR MENSES: ICD-10-CM

## 2018-11-16 DIAGNOSIS — J32.9 SINUSITIS, UNSPECIFIED CHRONICITY, UNSPECIFIED LOCATION: ICD-10-CM

## 2018-11-16 DIAGNOSIS — F41.8 SITUATIONAL ANXIETY: ICD-10-CM

## 2018-11-16 DIAGNOSIS — E66.9 OBESITY (BMI 30-39.9): ICD-10-CM

## 2018-11-16 DIAGNOSIS — E03.9 ACQUIRED HYPOTHYROIDISM: ICD-10-CM

## 2018-11-16 DIAGNOSIS — M54.17 LUMBOSACRAL RADICULOPATHY: ICD-10-CM

## 2018-11-16 DIAGNOSIS — M51.26 LUMBAR HERNIATED DISC: ICD-10-CM

## 2018-11-16 DIAGNOSIS — Z00.00 GENERAL MEDICAL EXAM: Primary | ICD-10-CM

## 2018-11-16 DIAGNOSIS — F17.200 SMOKER: ICD-10-CM

## 2018-11-16 LAB — HCG INTACT+B SERPL-ACNC: <5 MIU/ML

## 2018-11-16 PROCEDURE — 99395 PREV VISIT EST AGE 18-39: CPT | Performed by: PHYSICIAN ASSISTANT

## 2018-11-16 PROCEDURE — 84702 CHORIONIC GONADOTROPIN TEST: CPT

## 2018-11-16 PROCEDURE — 36415 COLL VENOUS BLD VENIPUNCTURE: CPT

## 2018-11-16 RX ORDER — MEDROXYPROGESTERONE ACETATE 10 MG/1
10 TABLET ORAL DAILY
Qty: 10 TABLET | Refills: 0 | Status: SHIPPED | OUTPATIENT
Start: 2018-11-16 | End: 2018-11-26

## 2018-11-16 RX ORDER — METFORMIN HYDROCHLORIDE 750 MG/1
750 TABLET, EXTENDED RELEASE ORAL
Qty: 30 TABLET | Refills: 2 | Status: SHIPPED | OUTPATIENT
Start: 2018-11-16 | End: 2019-02-26

## 2018-11-16 RX ORDER — AMOXICILLIN 875 MG/1
875 TABLET, COATED ORAL 2 TIMES DAILY
Qty: 20 TABLET | Refills: 0 | Status: SHIPPED | OUTPATIENT
Start: 2018-11-16 | End: 2018-12-10

## 2018-11-16 NOTE — PROGRESS NOTES
Subjective   Brenton Vila is a 28 y.o. female  Back Pain (Follow up on back pain, req refills on Norco) and Annual Exam      History of Present Illness  Patient presents today for a preventive medical visit.  Patient is here to determine screening labs and tests that are due and to determine immunization status as well.  Patient will be counseled regarding preventative medicine issues such as regular exercise and  healthy diet as well.    The following portions of the patient's history were reviewed and updated as appropriate: allergies, current medications, past social history and problem list    Review of Systems   Constitutional: Negative.  Negative for chills, fatigue and fever.   HENT: Positive for congestion, postnasal drip, rhinorrhea and sinus pressure. Negative for ear pain and sore throat.    Eyes: Negative.  Negative for pain.   Respiratory: Negative.  Negative for cough and shortness of breath.    Cardiovascular: Negative.    Gastrointestinal: Negative.    Endocrine: Negative.    Genitourinary: Negative.    Musculoskeletal: Positive for back pain. Negative for arthralgias, gait problem and myalgias.        Patient states she's still having continuous daily back pain typically ranges between a 5-7 out of 10, she has been taking 5-6 Norco a day until earlier this week she was able to reduce down to 4 day.  She would like to try to reduce the dosage down to 7.5 Norco instead of the current 10 normal toes.  She received her second spinal injection a couple of weeks ago when she states she can tell this week that there has been minimal improvement.  However she's continued to have chronic low back pain radiating to her left posterior thigh to the knee and the right buttock.  She is scheduled to see her pain specialist for consultation on the 30th with plans to do a third injection.   Skin: Negative.    Allergic/Immunologic: Negative.    Neurological: Positive for headaches. Negative for dizziness, tremors,  weakness and numbness.   Hematological: Negative.  Negative for adenopathy.   Psychiatric/Behavioral: Negative.    All other systems reviewed and are negative.      Objective     Vitals:    11/16/18 1514   BP: 142/88   Pulse: 80   Temp: 98.4 °F (36.9 °C)   SpO2: 99%       Physical Exam   Constitutional: She is oriented to person, place, and time. She appears well-developed and well-nourished. No distress.   Obesity noted     HENT:   Head: Normocephalic and atraumatic.   Right Ear: Tympanic membrane, external ear and ear canal normal.   Left Ear: Tympanic membrane, external ear and ear canal normal.   Nose: Mucosal edema, rhinorrhea and sinus tenderness present. Right sinus exhibits maxillary sinus tenderness and frontal sinus tenderness. Left sinus exhibits maxillary sinus tenderness and frontal sinus tenderness.   Mouth/Throat: Oropharynx is clear and moist. No oropharyngeal exudate.   Eyes: Conjunctivae and EOM are normal. Pupils are equal, round, and reactive to light. No scleral icterus.   Neck: Normal range of motion. Neck supple. No JVD present. Carotid bruit is not present. No thyromegaly present.   Cardiovascular: Normal rate, regular rhythm, normal heart sounds and intact distal pulses.   No murmur heard.  Pulmonary/Chest: Effort normal and breath sounds normal. No respiratory distress.   Abdominal: Soft. Bowel sounds are normal. She exhibits no mass. There is no tenderness.   Musculoskeletal: She exhibits tenderness ( Shad lumbar sacral region with decreased range of motion both flexion and extension). She exhibits no edema.        Lumbar back: She exhibits decreased range of motion, tenderness, bony tenderness and pain. She exhibits no swelling, no deformity and no spasm.   Lymphadenopathy:     She has no cervical adenopathy.   Neurological: She is alert and oriented to person, place, and time. She has normal reflexes. She displays normal reflexes. A sensory deficit is present. No cranial nerve deficit.  Coordination normal.   Skin: Skin is warm and dry. She is not diaphoretic. No pallor.   Psychiatric: She has a normal mood and affect. Her behavior is normal. Judgment and thought content normal.   Nursing note and vitals reviewed.    Discussed preventative medicine issues with patient including regular exercise, healthy diet, stress reduction, adequate sleep and recommended age-appropriate screening studies.  Assessment/Plan     Diagnoses and all orders for this visit:    General medical exam    Lumbar herniated disc    Smoker    Lumbosacral radiculopathy    Obesity (BMI 30-39.9)    Acquired hypothyroidism    Situational anxiety    Sinusitis, unspecified chronicity, unspecified location    Other orders  -     amoxicillin (AMOXIL) 875 MG tablet; Take 1 tablet by mouth 2 (Two) Times a Day.    Encouraged weight loss through low calorie low carb and high protein diet, discussed the correlation between this and helping her chronic back pain.  Encourage smoking cessation.  Refilled Norco at a reduced dosage of 7.5/325 one 4 times daily #120 with no refills discussed abuse potential for this medication and for the need for us to slowly reduce back her dosage as tolerated over the course of the next 6 weeks as she receives another spinal injection.  Patient is understanding of this and are supportive of this.  She'll continue follow-up with her pain management specialist for injections and will see me back in one month.

## 2018-11-16 NOTE — TELEPHONE ENCOUNTER
----- Message from Marry Rowan RN sent at 11/16/2018 11:56 AM EST -----  She wants to know what the next step would be, ?? Metformin??  ----- Message -----  From: Johny Barakat MD  Sent: 11/16/2018  11:31 AM  To: [facility]    Please call Brenton and let her know her pregnancy test results were negative.

## 2018-11-16 NOTE — TELEPHONE ENCOUNTER
Spoke with pt, advising her of Dr Barakat' plans for continuation of care. Pt verbalizes understanding and will begin new prescriptions tomorrow. Appt made for 12/27/18 for f/u.

## 2018-11-16 NOTE — TELEPHONE ENCOUNTER
I'd like to give her Provera 10 mg for 10 days to induce a cycle.  I also like her to begin on Glucophage (metformin).  A prescription for each s been sent to her pharmacy.  I like to see her back in about 6 weeks to see how she is doing on this medication and recheck some lab work.  If all is going well at that point we'll probably increase the dose of metformin further in an effort to help suppress insulin levels.  Hopefully this will help her to conceive more effectively with Clomid.    New Medications Ordered This Visit   Medications   • medroxyPROGESTERone (PROVERA) 10 MG tablet     Sig: Take 1 tablet by mouth Daily for 10 days.     Dispense:  10 tablet     Refill:  0   • metFORMIN ER (GLUCOPHAGE XR) 750 MG 24 hr tablet     Sig: Take 1 tablet by mouth Daily With Breakfast.     Dispense:  30 tablet     Refill:  2

## 2018-11-30 ENCOUNTER — TELEPHONE (OUTPATIENT)
Dept: FAMILY MEDICINE CLINIC | Facility: CLINIC | Age: 28
End: 2018-11-30

## 2018-11-30 NOTE — TELEPHONE ENCOUNTER
----- Message from Pavithra Krueger sent at 11/30/2018 10:51 AM EST -----  Contact: PT.  PT. SEE'S MARIALU[facility].  PT. IS NEEDING TO TALK TO PROVIDER RE. APPROVAL FOR ANOTHER MEDICATION FOR BACK PAIN ISSUES.    RX=[facility].    PT. CAN BE REACHED @ [facility].

## 2018-11-30 NOTE — TELEPHONE ENCOUNTER
Please call patient to see what specific issue she is having.  I currently have her on Percocet and I believe she is also on gabapentin, she is being seen by pain management and has already received 1 spinal injection and is scheduled for a second.  I would recommend that she contact her pain management doctor as well regarding her current symptoms.

## 2018-11-30 NOTE — TELEPHONE ENCOUNTER
Patient states back is very painful, very hard to ambulate, notified patient she needs to contact pain management to see if they need to do anything

## 2018-11-30 NOTE — TELEPHONE ENCOUNTER
Patient states she woke up this morning and her back is hurting like it did in the beginning. She states its hard for her to walk due to the pain. She wants to know if  Anything can be done or sent for her.

## 2018-12-06 ENCOUNTER — CLINICAL SUPPORT (OUTPATIENT)
Dept: RETAIL CLINIC | Facility: CLINIC | Age: 28
End: 2018-12-06

## 2018-12-06 DIAGNOSIS — Z11.1 VISIT FOR TB SKIN TEST: Primary | ICD-10-CM

## 2018-12-06 PROCEDURE — 86580 TB INTRADERMAL TEST: CPT | Performed by: NURSE PRACTITIONER

## 2018-12-06 NOTE — PROGRESS NOTES
CC:Presents for Tb screening.     S: Has never had a positive test for Tb or been infected with Tb.  Denies symptoms of active Tb and risk factors for acquiring latent or active Tb:  Has not had a cough> 3 weeks, hemoptysis, unexplained fever, unexplained weight loss, fatigue, night sweats, or change in appetite.  s not a high risk contact of person known or suspected of having Tb.  Has not been to another country for 3 or more months where Tb is common.  Has been in the [place] for > 5 years  Is not a resident or employee of high Tb risk congregate setting.  Is not a health care worker who serves high-risk patients.  Is not medically underserved.  Has not been homeless in past 2 years.  Does not inject illicit drugs or use crack cocaine.  Is not HIV positive, or considered at risk for HIV if status is unknown.   Is not imunosuppressed or on immunosuppressive therapy.  Is not malnourished or >10% below ideal body weight.    O: Appears well today. Respirations are even & unlabored. Lungs are CTA bilaterally.    A: TST given as directed.     P: See scanned copy.     Libia Davis, BATSHEVA

## 2018-12-08 LAB
INDURATION: 0 MM (ref 0–10)
TB SKIN TEST: NEGATIVE

## 2018-12-10 ENCOUNTER — OFFICE VISIT (OUTPATIENT)
Dept: FAMILY MEDICINE CLINIC | Facility: CLINIC | Age: 28
End: 2018-12-10

## 2018-12-10 VITALS
DIASTOLIC BLOOD PRESSURE: 92 MMHG | HEART RATE: 111 BPM | TEMPERATURE: 98.7 F | WEIGHT: 260 LBS | HEIGHT: 69 IN | SYSTOLIC BLOOD PRESSURE: 136 MMHG | BODY MASS INDEX: 38.51 KG/M2 | OXYGEN SATURATION: 100 %

## 2018-12-10 DIAGNOSIS — M54.17 LUMBOSACRAL RADICULOPATHY: ICD-10-CM

## 2018-12-10 DIAGNOSIS — M51.26 LUMBAR HERNIATED DISC: ICD-10-CM

## 2018-12-10 DIAGNOSIS — F43.23 SITUATIONAL MIXED ANXIETY AND DEPRESSIVE DISORDER: ICD-10-CM

## 2018-12-10 DIAGNOSIS — R35.0 FREQUENT URINATION: Primary | ICD-10-CM

## 2018-12-10 DIAGNOSIS — N76.0 ACUTE VAGINITIS: ICD-10-CM

## 2018-12-10 LAB
BILIRUB BLD-MCNC: NEGATIVE MG/DL
CLARITY, POC: CLEAR
COLOR UR: YELLOW
GLUCOSE UR STRIP-MCNC: NEGATIVE MG/DL
KETONES UR QL: NEGATIVE
LEUKOCYTE EST, POC: NEGATIVE
NITRITE UR-MCNC: NEGATIVE MG/ML
PH UR: 6 [PH] (ref 5–8)
PROT UR STRIP-MCNC: NEGATIVE MG/DL
RBC # UR STRIP: NEGATIVE /UL
SP GR UR: 1.01 (ref 1–1.03)
UROBILINOGEN UR QL: NORMAL

## 2018-12-10 PROCEDURE — 81003 URINALYSIS AUTO W/O SCOPE: CPT | Performed by: PHYSICIAN ASSISTANT

## 2018-12-10 PROCEDURE — 99214 OFFICE O/P EST MOD 30 MIN: CPT | Performed by: PHYSICIAN ASSISTANT

## 2018-12-10 RX ORDER — HYDROCODONE BITARTRATE AND ACETAMINOPHEN 7.5; 325 MG/1; MG/1
1 TABLET ORAL 4 TIMES DAILY
Refills: 0 | COMMUNITY
Start: 2018-11-16 | End: 2019-02-26

## 2018-12-10 RX ORDER — PHENTERMINE HYDROCHLORIDE 30 MG/1
CAPSULE ORAL
COMMUNITY
End: 2019-02-26

## 2018-12-10 RX ORDER — DULOXETIN HYDROCHLORIDE 60 MG/1
60 CAPSULE, DELAYED RELEASE ORAL DAILY
Qty: 30 CAPSULE | Refills: 5 | Status: SHIPPED | OUTPATIENT
Start: 2018-12-10 | End: 2019-06-27 | Stop reason: SDUPTHER

## 2018-12-10 RX ORDER — METRONIDAZOLE 500 MG/1
500 TABLET ORAL 2 TIMES DAILY
Qty: 10 TABLET | Refills: 0 | Status: SHIPPED | OUTPATIENT
Start: 2018-12-10 | End: 2019-02-26

## 2018-12-10 RX ORDER — TOPIRAMATE 25 MG/1
TABLET ORAL
Qty: 90 TABLET | Refills: 5 | Status: SHIPPED | OUTPATIENT
Start: 2018-12-10 | End: 2019-06-12 | Stop reason: SDUPTHER

## 2018-12-10 NOTE — PROGRESS NOTES
Subjective   Brenton Vila is a 28 y.o. female  Back Pain (follow up back pain, req refills on Norco); Vaginal Discharge (vaginal discharge x2 days ); and Urinary Frequency (frequent urination x2 days )      History of Present Illness  Patient comes in today for 4 separate issues one is for follow-up on chronic back pain associated with lumbar herniated disc with radiculopathy.  She states that her pain is no better.  She is extremely frustrated about this.  She states is difficult for her to carry out daily activities from daily care of herself to getting to work to functioning at work.  She states she is concerned that she is going to lose her job.  She states she has daily pain continuously.  She states her pain ranges from a 6 out of 10 to an 8 out of 10 throughout the day.  pain continues to interfere with her sleep.  She has seen pain management for spinal injections, she has received 2 injections she had minimal improvement with the first injection then symptoms returned, no significant improvement with a second injection in her insurance currently has denied a third injection although she is open to the option of utilizing this method of treatment if her insurance approves that, she is currently in contact with the pain management physician regarding this but states that they are prescribing gabapentin but no other pain medications for her.  She states she is having a very difficult time emotionally with this ongoing pain and had is affecting her life.  She is currently on Zoloft 50 mg daily but states she started to feel depressed.  She denies homicidal or suicidal ideations.  She states she worries a lot as well about her future.  Additionally she's been having frequent urination for the past 2 days and a thin watery discharge for the past 2 days vaginally.  She has no new sexual partners, denies any pelvic pain or abdominal pain, no pain with urination no blood in urine, no odor to discharge and no  discoloration with a discharge.  The following portions of the patient's history were reviewed and updated as appropriate: allergies, current medications, past social history and problem list    Review of Systems   Constitutional: Negative.  Negative for appetite change, chills, fatigue and fever.   HENT: Negative.    Respiratory: Negative.  Negative for chest tightness and shortness of breath.    Gastrointestinal: Negative.  Negative for abdominal pain, diarrhea, nausea and vomiting.   Genitourinary: Positive for frequency and vaginal discharge. Negative for hematuria, pelvic pain and vaginal bleeding.   Musculoskeletal: Positive for back pain. Negative for arthralgias, gait problem and myalgias.   Neurological: Positive for numbness. Negative for dizziness, tremors, weakness, light-headedness and headaches.   Psychiatric/Behavioral: Positive for dysphoric mood and sleep disturbance. Negative for agitation, behavioral problems, confusion, decreased concentration, hallucinations, self-injury and suicidal ideas. The patient is nervous/anxious. The patient is not hyperactive.        Objective     Vitals:    12/10/18 1425   BP: 136/92   Pulse: 111   Temp: 98.7 °F (37.1 °C)   SpO2: 100%       Physical Exam   Constitutional: She is oriented to person, place, and time. She appears well-developed and well-nourished.  Non-toxic appearance. She does not have a sickly appearance. No distress.   Patient appears uncomfortable but not in any distress   HENT:   Head: Normocephalic and atraumatic.   Neck: No thyroid mass and no thyromegaly present.   Cardiovascular: Normal rate, regular rhythm and normal heart sounds.   Pulmonary/Chest: Effort normal and breath sounds normal. No respiratory distress.   Abdominal: Soft. Bowel sounds are normal. She exhibits no distension. There is no tenderness. There is no rebound and no guarding.   Genitourinary:   Genitourinary Comments:  exam deferred, patient denies any genital lesions or  sores   Musculoskeletal: She exhibits tenderness ( Tenderness left lower back lumbar sacral region, gait is antalgic). She exhibits no edema or deformity.        Lumbar back: She exhibits decreased range of motion, tenderness, bony tenderness and pain. She exhibits no swelling, no deformity and no spasm.   Neurological: She is alert and oriented to person, place, and time. She has normal reflexes.   Skin: Skin is warm and dry. No rash noted. She is not diaphoretic.   Psychiatric: Her speech is normal and behavior is normal. Judgment and thought content normal. Her mood appears anxious. Her affect is not angry and not inappropriate. Cognition and memory are normal. She does not exhibit a depressed mood. She is attentive.   Nursing note and vitals reviewed.      Assessment/Plan     Diagnoses and all orders for this visit:    Frequent urination  -     POC Urinalysis Dipstick, Automated    Lumbar herniated disc  -     Ambulatory Referral to Pain Management    Lumbosacral radiculopathy  -     Ambulatory Referral to Pain Management    Situational mixed anxiety and depressive disorder    Acute vaginitis    Other orders  -     phentermine 30 MG capsule;  Take 1 capsule every day by oral route.  -     HYDROcodone-acetaminophen (NORCO) 7.5-325 MG per tablet; Take 1 tablet by mouth 4 (Four) Times a Day.  -     metroNIDAZOLE (FLAGYL) 500 MG tablet; Take 1 tablet by mouth 2 (Two) Times a Day.  -     DULoxetine (CYMBALTA) 60 MG capsule; Take 1 capsule by mouth Daily. Nightly for anxiety  -     topiramate (TOPAMAX) 25 MG tablet; Take one in morning and 2 in evenings for migraines, new dose    Discussed with patient and vaginal discharge persist after Flagyl completed would need to return for pelvic exam and vaginal testing.  Refilled Norco 7.5/325 one 4 times a day for chronic back pain associated with lumbar herniated disc dispense 120 discussed abuse potential this medication with patient, Andrew reviewed, appropriate, continue  following up with her current pain management specialist for injections if approved by insurance, new referral placed for pain management referral for chronic pain with Dr. Cerna, discussed with patient that our office would not be able to continue prescribing narcotics for her on a long-term basis but that we would continue filling this until she was seen and evaluated by Dr. Cerna.  Patient expresses understanding.  Started on Cymbalta and patient will discontinue Zoloft, she'll follow-up with me in one month for recheck of depression and anxiety and sooner symptoms worsen.

## 2018-12-11 NOTE — PROGRESS NOTES
I have reviewed the notes, assessments, and/or procedures performed by Mahnaz Dutton PA-C, I concur with her documentation of Brenton Vila.

## 2018-12-26 ENCOUNTER — TELEPHONE (OUTPATIENT)
Dept: FAMILY MEDICINE CLINIC | Facility: CLINIC | Age: 28
End: 2018-12-26

## 2018-12-26 NOTE — TELEPHONE ENCOUNTER
Regarding: Prescription Question  Contact: [phone]  ----- Message from Saberr, Generic sent at 12/26/2018  7:28 AM EST -----    I don't know if it is the Cymbalta or what, but I am having serious anxiety for the past two days.

## 2018-12-26 NOTE — TELEPHONE ENCOUNTER
Patient states she has been on Cymbalta for 2 weeks, she has been having a heaviness in chest (like a panic attack is going to happen). Denies vomiting, nausea, dizziness. Patient was previously on Zoloft.

## 2018-12-26 NOTE — TELEPHONE ENCOUNTER
Diana, please call patient back this morning and find out some more details.  Find out if she just started on the Cymbalta if the anxiety started after that or some other details.

## 2018-12-26 NOTE — TELEPHONE ENCOUNTER
I do not think it is the Cymbalta I think it is that she is having problems with worsening anxiety which is not currently completely controlled.  I would like for her to stay on the Cymbalta and please call in Ativan 1 mg take half to 1 as needed for panic/anxiety #30.  Call if this does not improve the symptoms after the weekend.

## 2018-12-27 ENCOUNTER — TELEPHONE (OUTPATIENT)
Dept: FAMILY MEDICINE CLINIC | Facility: CLINIC | Age: 28
End: 2018-12-27

## 2018-12-27 NOTE — TELEPHONE ENCOUNTER
Tried to call the pharm, was put on hold twice. LVm for pharmacy to discontinue clonazepam and start the ativan.

## 2018-12-27 NOTE — TELEPHONE ENCOUNTER
Patient called and stated there appears to be confusion between our office and the pharmacy and is requesting a call at [phone]

## 2018-12-28 ENCOUNTER — TELEPHONE (OUTPATIENT)
Dept: FAMILY MEDICINE CLINIC | Facility: CLINIC | Age: 28
End: 2018-12-28

## 2018-12-28 NOTE — TELEPHONE ENCOUNTER
----- Message from Pavithra Krueger sent at 12/28/2018  8:53 AM EST -----  Contact: [facility] [place], [phone]  [place] @ RX IS CALLING RE. CLARIFICATION OF PT'S. ATIVAN, 1 MG., TAKEN 1 1/2-1/DAY.  WHAT SUPPLY DO YOU WANT?  WHAT IS THE MAX. FOR DOSAGE?  CONTACT [place] BACK @ [facility].

## 2019-01-29 RX ORDER — LEVOTHYROXINE SODIUM 0.03 MG/1
TABLET ORAL
Qty: 30 TABLET | Refills: 5 | OUTPATIENT
Start: 2019-01-29

## 2019-01-29 RX ORDER — LEVOTHYROXINE SODIUM 0.03 MG/1
25 TABLET ORAL DAILY
Qty: 30 TABLET | Refills: 0 | Status: SHIPPED | OUTPATIENT
Start: 2019-01-29 | End: 2019-02-26 | Stop reason: SDUPTHER

## 2019-02-26 ENCOUNTER — OFFICE VISIT (OUTPATIENT)
Dept: FAMILY MEDICINE CLINIC | Facility: CLINIC | Age: 29
End: 2019-02-26

## 2019-02-26 ENCOUNTER — LAB (OUTPATIENT)
Dept: LAB | Facility: HOSPITAL | Age: 29
End: 2019-02-26

## 2019-02-26 VITALS
HEIGHT: 69 IN | HEART RATE: 83 BPM | DIASTOLIC BLOOD PRESSURE: 88 MMHG | SYSTOLIC BLOOD PRESSURE: 138 MMHG | WEIGHT: 263 LBS | OXYGEN SATURATION: 99 % | BODY MASS INDEX: 38.95 KG/M2 | TEMPERATURE: 98.3 F

## 2019-02-26 DIAGNOSIS — R53.83 FATIGUE, UNSPECIFIED TYPE: ICD-10-CM

## 2019-02-26 DIAGNOSIS — E53.8 B12 DEFICIENCY: ICD-10-CM

## 2019-02-26 DIAGNOSIS — F41.1 GENERALIZED ANXIETY DISORDER: ICD-10-CM

## 2019-02-26 DIAGNOSIS — E03.9 ACQUIRED HYPOTHYROIDISM: Primary | ICD-10-CM

## 2019-02-26 LAB
25(OH)D3 SERPL-MCNC: 14.2 NG/ML
ANION GAP SERPL CALCULATED.3IONS-SCNC: 9 MMOL/L (ref 3–11)
BUN BLD-MCNC: 12 MG/DL (ref 9–23)
BUN/CREAT SERPL: 13.8 (ref 7–25)
CALCIUM SPEC-SCNC: 9.7 MG/DL (ref 8.7–10.4)
CHLORIDE SERPL-SCNC: 105 MMOL/L (ref 99–109)
CO2 SERPL-SCNC: 24 MMOL/L (ref 20–31)
CREAT BLD-MCNC: 0.87 MG/DL (ref 0.6–1.3)
DEPRECATED RDW RBC AUTO: 47.2 FL (ref 37–54)
ERYTHROCYTE [DISTWIDTH] IN BLOOD BY AUTOMATED COUNT: 13.6 % (ref 11.3–14.5)
GFR SERPL CREATININE-BSD FRML MDRD: 78 ML/MIN/1.73
GLUCOSE BLD-MCNC: 75 MG/DL (ref 70–100)
HCT VFR BLD AUTO: 40.5 % (ref 34.5–44)
HGB BLD-MCNC: 13.6 G/DL (ref 11.5–15.5)
MCH RBC QN AUTO: 31.9 PG (ref 27–31)
MCHC RBC AUTO-ENTMCNC: 33.6 G/DL (ref 32–36)
MCV RBC AUTO: 94.8 FL (ref 80–99)
PLATELET # BLD AUTO: 350 10*3/MM3 (ref 150–450)
PMV BLD AUTO: 9.8 FL (ref 6–12)
POTASSIUM BLD-SCNC: 4.1 MMOL/L (ref 3.5–5.5)
RBC # BLD AUTO: 4.27 10*6/MM3 (ref 3.89–5.14)
SODIUM BLD-SCNC: 138 MMOL/L (ref 132–146)
TSH SERPL DL<=0.05 MIU/L-ACNC: 1.02 MIU/ML (ref 0.35–5.35)
VIT B12 BLD-MCNC: 320 PG/ML (ref 211–911)
WBC NRBC COR # BLD: 9.65 10*3/MM3 (ref 3.5–10.8)

## 2019-02-26 PROCEDURE — 85027 COMPLETE CBC AUTOMATED: CPT | Performed by: PHYSICIAN ASSISTANT

## 2019-02-26 PROCEDURE — 36415 COLL VENOUS BLD VENIPUNCTURE: CPT | Performed by: PHYSICIAN ASSISTANT

## 2019-02-26 PROCEDURE — 99214 OFFICE O/P EST MOD 30 MIN: CPT | Performed by: PHYSICIAN ASSISTANT

## 2019-02-26 PROCEDURE — 82306 VITAMIN D 25 HYDROXY: CPT

## 2019-02-26 PROCEDURE — 84443 ASSAY THYROID STIM HORMONE: CPT

## 2019-02-26 PROCEDURE — 80048 BASIC METABOLIC PNL TOTAL CA: CPT

## 2019-02-26 PROCEDURE — 82607 VITAMIN B-12: CPT

## 2019-02-26 RX ORDER — HYDROCODONE BITARTRATE AND ACETAMINOPHEN 5; 325 MG/1; MG/1
TABLET ORAL
Refills: 0 | COMMUNITY
Start: 2019-02-07 | End: 2019-07-03

## 2019-02-26 RX ORDER — LEVOTHYROXINE SODIUM 0.03 MG/1
25 TABLET ORAL DAILY
Qty: 90 TABLET | Refills: 3 | Status: SHIPPED | OUTPATIENT
Start: 2019-02-26 | End: 2020-03-02

## 2019-02-26 RX ORDER — LORAZEPAM 1 MG/1
TABLET ORAL
COMMUNITY
Start: 2018-12-28 | End: 2019-07-03

## 2019-02-26 NOTE — PROGRESS NOTES
Subjective   Brenton Vila is a 29 y.o. female  Hypothyroidism (Follow up on thyroid, req refills on levothyroxine ) and Anxiety (Req refills on Lorazepam for anxiety)      History of Present Illness  Patient comes in today for follow-up on hypothyroidism and generalized anxiety disorder.  She states she has been feeling a lot more fatigue lately, having difficulty getting up in the mornings and is worried that maybe her blood levels are out of range.  She is to take B12 injections for B12 deficiency and wonders if that is low.  Denies depression.  Anxiety is stable, uses lorazepam only on an as-needed basis and is on Cymbalta.  The following portions of the patient's history were reviewed and updated as appropriate: allergies, current medications, past social history and problem list    Review of Systems   Constitutional: Positive for fatigue. Negative for appetite change, chills, diaphoresis, fever and unexpected weight change.   HENT: Negative.    Eyes: Negative for visual disturbance.   Respiratory: Negative for chest tightness and shortness of breath.    Cardiovascular: Negative for chest pain and palpitations.   Gastrointestinal: Negative for abdominal pain, constipation, diarrhea and nausea.   Endocrine: Negative.  Negative for cold intolerance and heat intolerance.   Genitourinary: Negative.    Skin: Negative.    Allergic/Immunologic: Negative.    Neurological: Negative for dizziness, tremors, weakness, light-headedness and headaches.   Hematological: Negative.    Psychiatric/Behavioral: Negative for agitation, behavioral problems, confusion, decreased concentration, dysphoric mood, sleep disturbance and suicidal ideas. The patient is nervous/anxious ( Stable on medication).        Objective     Vitals:    02/26/19 1532   BP: 138/88   Pulse: 83   Temp: 98.3 °F (36.8 °C)   SpO2: 99%       Physical Exam   Constitutional: She is oriented to person, place, and time. She appears well-developed and well-nourished.  No distress.   Obesity noted     HENT:   Head: Normocephalic.   No Exopthalmos   Neck: No JVD present. No thyroid mass and no thyromegaly present.   Cardiovascular: Normal rate, regular rhythm and normal heart sounds.   Pulmonary/Chest: Effort normal and breath sounds normal. No respiratory distress.   Abdominal: Soft. There is no tenderness.   Lymphadenopathy:     She has no cervical adenopathy.   Neurological: She is alert and oriented to person, place, and time. No cranial nerve deficit. Coordination normal.   Skin: Skin is warm and dry. She is not diaphoretic.   Psychiatric: She has a normal mood and affect. Her speech is normal and behavior is normal. Judgment and thought content normal. Her affect is not angry and not inappropriate. Cognition and memory are normal. She does not exhibit a depressed mood. She is attentive.   Nursing note and vitals reviewed.      Assessment/Plan     Diagnoses and all orders for this visit:    Acquired hypothyroidism    Fatigue, unspecified type  -     TSH; Future  -     Vitamin D 25 Hydroxy; Future  -     Vitamin B12; Future  -     Basic Metabolic Panel; Future    B12 deficiency  -     Vitamin B12; Future  -     CBC (No Diff)    Generalized anxiety disorder    Other orders  -     HYDROcodone-acetaminophen (NORCO) 5-325 MG per tablet; TK 1 T PO  Q 6 H FOR 30 DAYS  -     LORazepam (ATIVAN) 1 MG tablet; Once daily  -     levothyroxine (SYNTHROID, LEVOTHROID) 25 MCG tablet; Take 1 tablet by mouth Daily.    Refill lorazepam 1 mg 1 daily as needed for panic or generalized anxiety disorder flares #30 with 5 refills discussed abuse potential this medication Andrew reviewed, appropriate.  Will check above labs for further evaluation.

## 2019-02-27 ENCOUNTER — TELEPHONE (OUTPATIENT)
Dept: FAMILY MEDICINE CLINIC | Facility: CLINIC | Age: 29
End: 2019-02-27

## 2019-02-27 RX ORDER — MAGNESIUM 200 MG
1 TABLET ORAL DAILY
Qty: 30 EACH | Refills: 11 | Status: SHIPPED | OUTPATIENT
Start: 2019-02-27 | End: 2020-10-12

## 2019-02-27 NOTE — TELEPHONE ENCOUNTER
----- Message from Mahnaz Dutton PA-C sent at 2/26/2019  6:37 PM EST -----  Please notify patient that her vitamin D level is quite low and this can cause fatigue and some muscle and joint aches.  I want her to take vitamin D3 5000 international units each morning.  Please call in a prescription for this #30 with 11 refills.  Her B12 is not below normal but it is not high either it is on the low end of normal.  I would recommend she take B12 sublingual 1000 mcg daily, please call this prescription and #30 with 11 refills.  The rest of her labs look good.  Thyroid is good, continue on current dosage of thyroid medication, blood sugar looks good.

## 2019-04-05 ENCOUNTER — TELEPHONE (OUTPATIENT)
Dept: FAMILY MEDICINE CLINIC | Facility: CLINIC | Age: 29
End: 2019-04-05

## 2019-04-05 NOTE — TELEPHONE ENCOUNTER
----- Message from Annetta Nix sent at 4/5/2019  9:07 AM EDT -----  Regarding: FW: work in       ----- Message -----  From: Annetta Nix  Sent: 4/5/2019   8:08 AM  To: Mahnaz Dutton PA-C  Subject: work in                                          She wanted to know if you can work her in today, she was crying on the phone, said anxiety was out the roof, she doesn't want to get out of bed. Really needs to see you today if possible. Thanks

## 2019-06-13 ENCOUNTER — TELEPHONE (OUTPATIENT)
Dept: FAMILY MEDICINE CLINIC | Facility: CLINIC | Age: 29
End: 2019-06-13

## 2019-06-13 RX ORDER — TOPIRAMATE 25 MG/1
TABLET ORAL
Qty: 90 TABLET | Refills: 5 | Status: SHIPPED | OUTPATIENT
Start: 2019-06-13 | End: 2020-06-19

## 2019-06-13 NOTE — TELEPHONE ENCOUNTER
----- Message from Kathy Gracia sent at 6/13/2019 10:31 AM EDT -----  Contact: PATIENT  PATIENT CALLED IN FOR MED REFILL OF topiramate (TOPAMAX) 25 MG tablet  [facility] IN [place]. PATIENT NUMBER [phone] THANK YOU

## 2019-06-14 RX ORDER — TOPIRAMATE 25 MG/1
TABLET ORAL
Qty: 90 TABLET | Refills: 5 | Status: SHIPPED | OUTPATIENT
Start: 2019-06-14 | End: 2019-07-03 | Stop reason: DRUGHIGH

## 2019-06-28 RX ORDER — DULOXETIN HYDROCHLORIDE 60 MG/1
60 CAPSULE, DELAYED RELEASE ORAL DAILY
Qty: 30 CAPSULE | Refills: 5 | OUTPATIENT
Start: 2019-06-28

## 2019-06-28 RX ORDER — DULOXETIN HYDROCHLORIDE 60 MG/1
CAPSULE, DELAYED RELEASE ORAL
Qty: 30 CAPSULE | Refills: 5 | Status: SHIPPED | OUTPATIENT
Start: 2019-06-28 | End: 2019-07-03 | Stop reason: SDUPTHER

## 2019-07-03 ENCOUNTER — OFFICE VISIT (OUTPATIENT)
Dept: FAMILY MEDICINE CLINIC | Facility: CLINIC | Age: 29
End: 2019-07-03

## 2019-07-03 VITALS
OXYGEN SATURATION: 98 % | HEART RATE: 86 BPM | WEIGHT: 255 LBS | TEMPERATURE: 98.5 F | SYSTOLIC BLOOD PRESSURE: 100 MMHG | BODY MASS INDEX: 37.77 KG/M2 | DIASTOLIC BLOOD PRESSURE: 68 MMHG | HEIGHT: 69 IN

## 2019-07-03 DIAGNOSIS — M54.41 CHRONIC RIGHT-SIDED LOW BACK PAIN WITH RIGHT-SIDED SCIATICA: ICD-10-CM

## 2019-07-03 DIAGNOSIS — F41.1 GENERALIZED ANXIETY DISORDER: Primary | ICD-10-CM

## 2019-07-03 DIAGNOSIS — G89.29 CHRONIC RIGHT-SIDED LOW BACK PAIN WITH RIGHT-SIDED SCIATICA: ICD-10-CM

## 2019-07-03 PROCEDURE — 99214 OFFICE O/P EST MOD 30 MIN: CPT | Performed by: PHYSICIAN ASSISTANT

## 2019-07-03 RX ORDER — DULOXETIN HYDROCHLORIDE 60 MG/1
60 CAPSULE, DELAYED RELEASE ORAL DAILY
Qty: 30 CAPSULE | Refills: 11 | Status: SHIPPED | OUTPATIENT
Start: 2019-07-03 | End: 2020-07-30

## 2019-07-03 RX ORDER — BUPRENORPHINE HYDROCHLORIDE AND NALOXONE HYDROCHLORIDE DIHYDRATE 8; 2 MG/1; MG/1
TABLET SUBLINGUAL
COMMUNITY

## 2019-07-03 NOTE — PROGRESS NOTES
Subjective   Brenton Vila is a 29 y.o. female  Anxiety (refills on cymbalta)      History of Present Illness  Patient comes today for follow-up on generalized anxiety disorder which is currently uncontrolled.  States she is felt very stressed because she continues to have worsening back pain with radiculopathy down her right lower extremity.  She states that she did get some facet joint injections which were helping and she was getting improved pain control with her pain management doctor [facility] pain management.  However she states that she could no longer afford to go there.  She states her insurance told her he was out of network and she is having to pay $300 every time she went.  States she currently has a bill for $4000 there and she cannot afford that.  She would like to see if there is another option that is in network with her insurance.  She states that when she was unable to get her chronic pain medication she started experiencing withdrawal symptoms so she is now currently being seen at a Suboxone clinic to try to treat the opioid addiction.  The following portions of the patient's history were reviewed and updated as appropriate: allergies, current medications, past social history and problem list    Review of Systems   Constitutional: Negative.  Negative for appetite change and fatigue.   Respiratory: Negative.  Negative for chest tightness and shortness of breath.    Gastrointestinal: Negative.  Negative for abdominal pain, diarrhea and nausea.   Genitourinary: Negative.    Musculoskeletal: Positive for back pain and myalgias. Negative for arthralgias and gait problem.   Skin: Negative.    Neurological: Positive for numbness. Negative for dizziness, tremors, weakness, light-headedness and headaches.   Psychiatric/Behavioral: Positive for dysphoric mood and sleep disturbance. Negative for agitation, behavioral problems, confusion, decreased concentration and suicidal ideas. The patient is  nervous/anxious.        Objective     Vitals:    07/03/19 1605   BP: 100/68   Pulse: 86   Temp: 98.5 °F (36.9 °C)   SpO2: 98%       Physical Exam   Constitutional: She is oriented to person, place, and time. She appears well-developed and well-nourished. No distress.   Neck: No thyroid mass and no thyromegaly present.   Cardiovascular: Normal rate, regular rhythm and normal heart sounds.   Pulmonary/Chest: Effort normal and breath sounds normal.   Abdominal: Soft. Bowel sounds are normal.   Musculoskeletal:        Lumbar back: She exhibits decreased range of motion, tenderness, bony tenderness and pain. She exhibits no swelling, no deformity and no spasm.   Neurological: She is alert and oriented to person, place, and time. She displays abnormal reflex. A sensory deficit is present. No cranial nerve deficit. She exhibits abnormal muscle tone. Coordination normal.   Skin: She is not diaphoretic.   Psychiatric: Her speech is normal and behavior is normal. Judgment and thought content normal. Her mood appears anxious. Her affect is not angry and not inappropriate. Cognition and memory are normal. She does not exhibit a depressed mood. She is attentive.   Nursing note and vitals reviewed.      Assessment/Plan     Diagnoses and all orders for this visit:    Generalized anxiety disorder    Chronic right-sided low back pain with right-sided sciatica  -     Ambulatory Referral to Pain Management    Other orders  -     buprenorphine-naloxone (SUBOXONE) 8-2 MG per SL tablet; buprenorphine 8 mg-naloxone 2 mg sublingual tablet  -     DULoxetine (CYMBALTA) 60 MG capsule; Take 1 capsule by mouth Daily.

## 2019-07-17 ENCOUNTER — TELEPHONE (OUTPATIENT)
Dept: FAMILY MEDICINE CLINIC | Facility: CLINIC | Age: 29
End: 2019-07-17

## 2019-07-24 ENCOUNTER — OFFICE VISIT (OUTPATIENT)
Dept: FAMILY MEDICINE CLINIC | Facility: CLINIC | Age: 29
End: 2019-07-24

## 2019-07-24 VITALS
HEIGHT: 69 IN | DIASTOLIC BLOOD PRESSURE: 84 MMHG | WEIGHT: 253 LBS | TEMPERATURE: 98.1 F | SYSTOLIC BLOOD PRESSURE: 120 MMHG | OXYGEN SATURATION: 98 % | BODY MASS INDEX: 37.47 KG/M2 | HEART RATE: 73 BPM

## 2019-07-24 DIAGNOSIS — M51.26 LUMBAR HERNIATED DISC: ICD-10-CM

## 2019-07-24 DIAGNOSIS — S39.012A ACUTE MYOFASCIAL STRAIN OF LUMBAR REGION, INITIAL ENCOUNTER: Primary | ICD-10-CM

## 2019-07-24 DIAGNOSIS — M54.41 CHRONIC RIGHT-SIDED LOW BACK PAIN WITH RIGHT-SIDED SCIATICA: ICD-10-CM

## 2019-07-24 DIAGNOSIS — G89.29 CHRONIC RIGHT-SIDED LOW BACK PAIN WITH RIGHT-SIDED SCIATICA: ICD-10-CM

## 2019-07-24 PROCEDURE — 99213 OFFICE O/P EST LOW 20 MIN: CPT | Performed by: PHYSICIAN ASSISTANT

## 2019-07-24 RX ORDER — TIZANIDINE 2 MG/1
2 TABLET ORAL EVERY 8 HOURS PRN
Qty: 30 TABLET | Refills: 3 | Status: SHIPPED | OUTPATIENT
Start: 2019-07-24 | End: 2020-08-28

## 2019-07-24 RX ORDER — METHYLPREDNISOLONE 4 MG/1
TABLET ORAL
Qty: 1 EACH | Refills: 0 | Status: SHIPPED | OUTPATIENT
Start: 2019-07-24 | End: 2020-02-19

## 2019-07-24 NOTE — PROGRESS NOTES
Subjective   Brenton Vila is a 29 y.o. female  Back Pain (increased lower back pain since picking up daughter on Sunday)      History of Present Illness  Patient comes in today for evaluation of sudden onset of low back pain since picking up her daughter on Sunday.  She states that she felt a sudden locking of her low back and was unable to straighten her back or walk.  She states she went to the [facility] on Sunday.  Had an x-ray done showing no fracture.  States she was given Flexeril which helped a little bit but she still have a lot of pain in her low back and tightness.  She is already dealing with chronic low back pain in association with a lumbar herniated disc see chart notes for that she is awaiting a referral to [place] pain management at this time.  She is on Suboxone due to having difficulty developing dependency with opioids from her chronic back pain in the past.  The following portions of the patient's history were reviewed and updated as appropriate: allergies, current medications, past social history and problem list    Review of Systems   Constitutional: Negative.    Respiratory: Negative.    Gastrointestinal: Negative.    Genitourinary: Negative.    Musculoskeletal: Positive for back pain and gait problem. Negative for arthralgias and myalgias.   Neurological: Negative for dizziness, tremors, weakness and numbness.       Objective     Vitals:    07/24/19 1028   BP: 120/84   Pulse: 73   Temp: 98.1 °F (36.7 °C)   SpO2: 98%       Physical Exam   Constitutional: She is oriented to person, place, and time. She appears well-developed and well-nourished. No distress.   Cardiovascular: Normal rate, regular rhythm and normal heart sounds.   Pulmonary/Chest: Effort normal and breath sounds normal.   Abdominal: Soft. Bowel sounds are normal.   Musculoskeletal:        Lumbar back: She exhibits decreased range of motion, tenderness, bony tenderness and pain. She exhibits no swelling, no deformity and  no spasm.   Neurological: She is alert and oriented to person, place, and time. She has normal reflexes.   Skin: Skin is warm and dry. No rash noted. She is not diaphoretic.   Psychiatric: She has a normal mood and affect. Her behavior is normal.   Nursing note and vitals reviewed.      Assessment/Plan     Diagnoses and all orders for this visit:    Acute myofascial strain of lumbar region, initial encounter    Chronic right-sided low back pain with right-sided sciatica    Lumbar herniated disc    Other orders  -     methylPREDNISolone (MEDROL, ELIZABETH,) 4 MG tablet; Take as directed on package instructions.  -     tiZANidine (ZANAFLEX) 2 MG tablet; Take 1 tablet by mouth Every 8 (Eight) Hours As Needed for Muscle Spasms.  -     diclofenac (VOLTAREN) 50 MG EC tablet; Take 1 tablet by mouth 3 (Three) Times a Day.

## 2020-01-06 RX ORDER — NICOTINE 21-14-7MG
KIT TRANSDERMAL
COMMUNITY
Start: 2020-01-03 | End: 2020-01-06 | Stop reason: SDUPTHER

## 2020-01-06 RX ORDER — NICOTINE 21-14-7MG
1 KIT TRANSDERMAL DAILY
Qty: 56 EACH | Refills: 1 | Status: SHIPPED | OUTPATIENT
Start: 2020-01-06 | End: 2020-08-28

## 2020-01-06 NOTE — TELEPHONE ENCOUNTER
----- Message from Brenton Vila sent at 2020 10:21 AM EST -----  Regarding: Prescription Question  Contact: [phone]  Hello,  My refills for my nicotine patches have  and I was wanting to see if you could call some in. I am trying to stop smoking.

## 2020-02-19 ENCOUNTER — OFFICE VISIT (OUTPATIENT)
Dept: RETAIL CLINIC | Facility: CLINIC | Age: 30
End: 2020-02-19

## 2020-02-19 VITALS
HEART RATE: 74 BPM | OXYGEN SATURATION: 98 % | TEMPERATURE: 98.1 F | BODY MASS INDEX: 36.56 KG/M2 | RESPIRATION RATE: 20 BRPM | WEIGHT: 247.6 LBS

## 2020-02-19 DIAGNOSIS — H10.021 PINK EYE DISEASE OF RIGHT EYE: Primary | ICD-10-CM

## 2020-02-19 PROCEDURE — 99213 OFFICE O/P EST LOW 20 MIN: CPT | Performed by: NURSE PRACTITIONER

## 2020-02-19 RX ORDER — POLYMYXIN B SULFATE AND TRIMETHOPRIM 1; 10000 MG/ML; [USP'U]/ML
1 SOLUTION OPHTHALMIC EVERY 4 HOURS
Qty: 1 EACH | Refills: 0 | Status: SHIPPED | OUTPATIENT
Start: 2020-02-19 | End: 2020-02-24

## 2020-02-19 NOTE — PROGRESS NOTES
Conjunctivitis      Subjective   Brenton Vila is a 30 y.o. female.     Conjunctivitis    The current episode started yesterday. The onset was sudden. The problem occurs continuously. The problem has been gradually worsening. The problem is moderate. Nothing relieves the symptoms. Nothing aggravates the symptoms. Associated symptoms include eye itching, photophobia, eye discharge and eye redness. Pertinent negatives include no fever, no decreased vision, no double vision, no diarrhea, no nausea, no vomiting, no congestion, no ear discharge, no ear pain, no headaches, no hearing loss, no mouth sores, no rhinorrhea, no sore throat, no stridor, no swollen glands, no muscle aches, no neck pain, no neck stiffness, no URI, no rash and no eye pain.        Patient Active Problem List   Diagnosis   • Annual GYN exam w/o problems   • Acquired hypothyroidism   • Mild dysplasia of cervix (SHARIFA I)   • Hyperinsulinemia   • Migraine   • Lumbar herniated disc   • Smoker   • Generalized anxiety disorder   • B12 deficiency       No Known Allergies     Current Outpatient Medications on File Prior to Visit   Medication Sig Dispense Refill   • Cholecalciferol (VITAMIN D3) 5000 units capsule capsule Take 1 capsule by mouth Daily. 30 capsule 11   • Cyanocobalamin (B-12) 1000 MCG sublingual tablet Place 1 tablet under the tongue Daily. 30 each 11   • diclofenac (VOLTAREN) 50 MG EC tablet Take 1 tablet by mouth 3 (Three) Times a Day. 30 tablet 3   • DULoxetine (CYMBALTA) 60 MG capsule Take 1 capsule by mouth Daily. 30 capsule 11   • levothyroxine (SYNTHROID, LEVOTHROID) 25 MCG tablet Take 1 tablet by mouth Daily. 90 tablet 3   • tiZANidine (ZANAFLEX) 2 MG tablet Take 1 tablet by mouth Every 8 (Eight) Hours As Needed for Muscle Spasms. 30 tablet 3   • topiramate (TOPAMAX) 25 MG tablet Take one in morning and 2 in evenings for migraines, new dose 90 tablet 5   • buprenorphine-naloxone (SUBOXONE) 8-2 MG per SL tablet buprenorphine 8 mg-naloxone  2 mg sublingual tablet     • Nicotine 21-14-7 MG/24HR kit Place 1 patch on the skin as directed by provider Daily. 56 each 1   • [DISCONTINUED] methylPREDNISolone (MEDROL, ELIZABETH,) 4 MG tablet Take as directed on package instructions. 1 each 0     No current facility-administered medications on file prior to visit.        Past Medical History:   Diagnosis Date   • Acquired hypothyroidism 2017   • History of PCOS 2015   • Hyperlipidemia     Off meds ~    • Lumbar herniated disc 2018    L2-L3 & L4-L5   • Migraine        Past Surgical History:   Procedure Laterality Date   • ENDOSCOPY  2016    Removal bile duct stone   • LAPAROSCOPIC CHOLECYSTECTOMY  2016       Family History   Problem Relation Age of Onset   • Breast cancer Paternal Grandmother        Social History     Socioeconomic History   • Marital status:      Spouse name: Not on file   • Number of children: Not on file   • Years of education: Not on file   • Highest education level: Not on file   Tobacco Use   • Smoking status: Current Some Day Smoker     Packs/day: 0.50     Types: Cigarettes     Start date:      Last attempt to quit: 2017     Years since quittin.5   • Smokeless tobacco: Never Used   Substance and Sexual Activity   • Alcohol use: No   • Drug use: No   • Sexual activity: Yes     Partners: Male       Travel:  No recent travel within the last 21 days outside the [place] Denies recent travel to one of the following [place]  Countries:  [place], [place], [place], or [place].  Denies contact with anyone who has traveled to one of the following [place]  Countries: [place], [place], [place], or [place] within the last 21 days and is known or suspected to have Ebola.  Denies having had any contact with the human remains, blood or any bodily fluids of someone who is known or suspected to have Ebola within the last 21 days.     OB History        1    Para   1    Term   0       1    AB        Living    1       SAB        TAB        Ectopic        Molar        Multiple        Live Births   1          Obstetric Comments   2014 - [place]             Review of Systems   Constitutional: Negative for activity change, appetite change, chills, fatigue and fever.   HENT: Negative for congestion, ear discharge, ear pain, hearing loss, mouth sores, rhinorrhea and sore throat.    Eyes: Positive for photophobia, discharge, redness and itching. Negative for double vision, pain and visual disturbance.   Respiratory: Negative for stridor.    Gastrointestinal: Negative for diarrhea, nausea and vomiting.   Musculoskeletal: Negative for neck pain.   Skin: Negative for rash.   Neurological: Negative for headaches.   All other systems reviewed and are negative.      Pulse 74   Temp 98.1 °F (36.7 °C) (Oral)   Resp 20   Wt 112 kg (247 lb 9.6 oz)   LMP 01/20/2020 (Exact Date)   SpO2 98%   Breastfeeding No   BMI 36.56 kg/m²     Objective   Physical Exam   Constitutional: She is oriented to person, place, and time. She appears well-developed and well-nourished. No distress.   HENT:   Head: Normocephalic and atraumatic.   Right Ear: External ear normal.   Left Ear: External ear normal.   Nose: Nose normal.   Eyes: Pupils are equal, round, and reactive to light. EOM and lids are normal. Lids are everted and swept, no foreign bodies found. Right eye exhibits discharge. Right conjunctiva is injected. Left conjunctiva is not injected.   Neck: Normal range of motion. Neck supple.   Cardiovascular: Normal rate and regular rhythm.   Pulmonary/Chest: Effort normal and breath sounds normal.   Musculoskeletal: Normal range of motion.   Neurological: She is alert and oriented to person, place, and time.   Skin: Skin is warm and dry. No rash noted. She is not diaphoretic.   Psychiatric: She has a normal mood and affect. Her behavior is normal.   Vitals reviewed.      Assessment/Plan   There are no diagnoses linked to this  encounter.    Results for orders placed or performed in visit on 02/26/19   TSH   Result Value Ref Range    TSH 1.025 0.350 - 5.350 mIU/mL   Vitamin D 25 Hydroxy   Result Value Ref Range    25 Hydroxy, Vitamin D 14.2 ng/ml   Vitamin B12   Result Value Ref Range    Vitamin B-12 320 211 - 911 pg/mL   Basic Metabolic Panel   Result Value Ref Range    Glucose 75 70 - 100 mg/dL    BUN 12 9 - 23 mg/dL    Creatinine 0.87 0.60 - 1.30 mg/dL    Sodium 138 132 - 146 mmol/L    Potassium 4.1 3.5 - 5.5 mmol/L    Chloride 105 99 - 109 mmol/L    CO2 24.0 20.0 - 31.0 mmol/L    Calcium 9.7 8.7 - 10.4 mg/dL    eGFR Non African Amer 78 >60 mL/min/1.73    BUN/Creatinine Ratio 13.8 7.0 - 25.0    Anion Gap 9.0 3.0 - 11.0 mmol/L       No follow-ups on file.

## 2020-02-19 NOTE — PATIENT INSTRUCTIONS
Bacterial Conjunctivitis, Adult  Bacterial conjunctivitis is an infection of your conjunctiva. This is the clear membrane that covers the white part of your eye and the inner part of your eyelid. This infection can make your eye:  · Red or pink.  · Itchy.  This condition spreads easily from person to person (is contagious) and from one eye to the other eye.  What are the causes?  · This condition is caused by germs (bacteria). You may get the infection if you come into close contact with:  ? A person who has the infection.  ? Items that have germs on them (are contaminated), such as face towels, contact lens solution, or eye makeup.  What increases the risk?  You are more likely to get this condition if you:  · Have contact with people who have the infection.  · Wear contact lenses.  · Have a sinus infection.  · Have had a recent eye injury or surgery.  · Have a weak body defense system (immune system).  · Have dry eyes.  What are the signs or symptoms?    · Thick, yellowish discharge from the eye.  · Tearing or watery eyes.  · Itchy eyes.  · Burning feeling in your eyes.  · Eye redness.  · Swollen eyelids.  · Blurred vision.  How is this treated?    · Antibiotic eye drops or ointment.  · Antibiotic medicine taken by mouth. This is used for infections that do not get better with drops or ointment or that last more than 10 days.  · Cool, wet cloths placed on the eyes.  · Artificial tears used 2-6 times a day.  Follow these instructions at home:  Medicines  · Take or apply your antibiotic medicine as told by your doctor. Do not stop taking or applying the antibiotic even if you start to feel better.  · Take or apply over-the-counter and prescription medicines only as told by your doctor.  · Do not touch your eyelid with the eye-drop bottle or the ointment tube.  Managing discomfort  · Wipe any fluid from your eye with a warm, wet washcloth or a cotton ball.  · Place a clean, cool, wet cloth on your eye. Do this for  10-20 minutes, 3-4 times per day.  General instructions  · Do not wear contacts until the infection is gone. Wear glasses until your doctor says it is okay to wear contacts again.  · Do not wear eye makeup until the infection is gone. Throw away old eye makeup.  · Change or wash your pillowcase every day.  · Do not share towels or washcloths.  · Wash your hands often with soap and water. Use paper towels to dry your hands.  · Do not touch or rub your eyes.  · Do not drive or use heavy machinery if your vision is blurred.  Contact a doctor if:  · You have a fever.  · You do not get better after 10 days.  Get help right away if:  · You have a fever and your symptoms get worse all of a sudden.  · You have very bad pain when you move your eye.  · Your face:  ? Hurts.  ? Is red.  ? Is swollen.  · You have sudden loss of vision.  Summary  · Bacterial conjunctivitis is an infection of your conjunctiva.  · This infection spreads easily from person to person.  · Wash your hands often with soap and water. Use paper towels to dry your hands.  · Take or apply your antibiotic medicine as told by your doctor.  · Contact a doctor if you have a fever or you do not get better after 10 days.  This information is not intended to replace advice given to you by your health care provider. Make sure you discuss any questions you have with your health care provider.  Document Released: 09/26/2009 Document Revised: 07/24/2019 Document Reviewed: 07/24/2019  [employer] Interactive Patient Education © 2020 [employer]

## 2020-03-02 RX ORDER — LEVOTHYROXINE SODIUM 0.03 MG/1
TABLET ORAL
Qty: 90 TABLET | Refills: 0 | Status: SHIPPED | OUTPATIENT
Start: 2020-03-02 | End: 2020-08-28 | Stop reason: SDUPTHER

## 2020-06-19 RX ORDER — TOPIRAMATE 25 MG/1
TABLET ORAL
Qty: 90 TABLET | Refills: 0 | Status: SHIPPED | OUTPATIENT
Start: 2020-06-19 | End: 2020-08-28 | Stop reason: SDUPTHER

## 2020-07-31 RX ORDER — DULOXETIN HYDROCHLORIDE 60 MG/1
60 CAPSULE, DELAYED RELEASE ORAL DAILY
Qty: 14 CAPSULE | Refills: 0 | Status: SHIPPED | OUTPATIENT
Start: 2020-07-31 | End: 2020-08-28 | Stop reason: SDUPTHER

## 2020-08-18 RX ORDER — TOPIRAMATE 25 MG/1
TABLET ORAL
Qty: 90 TABLET | Refills: 3 | OUTPATIENT
Start: 2020-08-18

## 2020-08-28 ENCOUNTER — OFFICE VISIT (OUTPATIENT)
Dept: FAMILY MEDICINE CLINIC | Facility: CLINIC | Age: 30
End: 2020-08-28

## 2020-08-28 VITALS
DIASTOLIC BLOOD PRESSURE: 72 MMHG | WEIGHT: 252 LBS | TEMPERATURE: 97.5 F | HEIGHT: 68 IN | SYSTOLIC BLOOD PRESSURE: 116 MMHG | BODY MASS INDEX: 38.19 KG/M2 | OXYGEN SATURATION: 99 % | HEART RATE: 70 BPM

## 2020-08-28 DIAGNOSIS — M54.2 CHRONIC NECK PAIN: ICD-10-CM

## 2020-08-28 DIAGNOSIS — F41.1 GENERALIZED ANXIETY DISORDER: ICD-10-CM

## 2020-08-28 DIAGNOSIS — G43.009 MIGRAINE WITHOUT AURA AND WITHOUT STATUS MIGRAINOSUS, NOT INTRACTABLE: ICD-10-CM

## 2020-08-28 DIAGNOSIS — L30.1 VESICULAR HAND ECZEMA: ICD-10-CM

## 2020-08-28 DIAGNOSIS — E03.9 ACQUIRED HYPOTHYROIDISM: Primary | ICD-10-CM

## 2020-08-28 DIAGNOSIS — M72.2 PLANTAR FASCIITIS OF LEFT FOOT: ICD-10-CM

## 2020-08-28 DIAGNOSIS — G89.29 CHRONIC NECK PAIN: ICD-10-CM

## 2020-08-28 PROCEDURE — 99214 OFFICE O/P EST MOD 30 MIN: CPT | Performed by: PHYSICIAN ASSISTANT

## 2020-08-28 RX ORDER — DULOXETIN HYDROCHLORIDE 60 MG/1
60 CAPSULE, DELAYED RELEASE ORAL DAILY
Qty: 30 CAPSULE | Refills: 11 | Status: SHIPPED | OUTPATIENT
Start: 2020-08-28 | End: 2021-10-01 | Stop reason: SDUPTHER

## 2020-08-28 RX ORDER — LEVOTHYROXINE SODIUM 0.03 MG/1
25 TABLET ORAL DAILY
Qty: 90 TABLET | Refills: 3 | Status: SHIPPED | OUTPATIENT
Start: 2020-08-28 | End: 2021-11-15 | Stop reason: SDUPTHER

## 2020-08-28 RX ORDER — TOPIRAMATE 25 MG/1
TABLET ORAL
Qty: 90 TABLET | Refills: 3 | Status: SHIPPED | OUTPATIENT
Start: 2020-08-28 | End: 2021-03-19 | Stop reason: SDUPTHER

## 2020-08-28 NOTE — PROGRESS NOTES
Subjective   Brenton Vila is a 30 y.o. female  Med Refill (Req med refill on Cymbalta, levothyroxine and topamax ); Foot Pain (Left heel pain, hurts worse in the AM x2 months ); and Blister (blisters on palms and fingers x2 months )      History of Present Illness  Patient is a pleasant 30-year-old white female who comes in today for annual follow-up on hypothyroidism, chronic migraines and generalized anxiety disorder.  History chronic ankle problems are currently stable medication due for refills as well as labs.  She also notes 3 new problems, she is been experiencing episodic headaches 2-3 times a week starting in the back of her head at the base of her neck and the base of her skull usually comes on at rest, she feels and hears a grinding sensation in her neck when she rotates her neck as well.  No history of trauma.  Patient does have history of degenerative disc disease of her lumbar spine.  Denies any low back pain at this time.  She states does not feel her migraine.  Addition she is been breaking out with a rash on her hands for last couple of months that itches and experiences pain in her heel of her left foot after prolonged rest for the past couple of months.  The following portions of the patient's history were reviewed and updated as appropriate: allergies, current medications, past social history and problem list    Review of Systems   Constitutional: Negative for activity change, appetite change, fatigue, fever and unexpected weight change.   Eyes: Negative for visual disturbance.   Respiratory: Negative for chest tightness and shortness of breath.    Cardiovascular: Negative for chest pain and palpitations.   Gastrointestinal: Negative for abdominal pain, constipation, diarrhea and nausea.   Endocrine: Negative for cold intolerance and heat intolerance.   Musculoskeletal: Positive for gait problem, myalgias and neck pain. Negative for arthralgias, joint swelling and neck stiffness.   Skin:  Positive for color change and rash. Negative for pallor and wound.   Neurological: Positive for headaches. Negative for dizziness, tremors, weakness, light-headedness and numbness.   Psychiatric/Behavioral: Negative for agitation, behavioral problems, confusion, decreased concentration, dysphoric mood, sleep disturbance and suicidal ideas. The patient is nervous/anxious ( Stable on medication).        Objective     Vitals:    08/28/20 1105   BP: 116/72   Pulse: 70   Temp: 97.5 °F (36.4 °C)   SpO2: 99%       Physical Exam   Constitutional: She is oriented to person, place, and time. She appears well-developed and well-nourished. No distress.   HENT:   Head: Normocephalic and atraumatic.   No Exopthalmos   Eyes: Pupils are equal, round, and reactive to light. Conjunctivae and EOM are normal.   Neck: Normal range of motion. Neck supple. No JVD present. Spinous process tenderness and muscular tenderness present. No tracheal deviation present. No thyromegaly present.   Cardiovascular: Normal rate and regular rhythm.   Pulmonary/Chest: Effort normal and breath sounds normal. No stridor.   Musculoskeletal: She exhibits tenderness. She exhibits no edema or deformity.   Lymphadenopathy:     She has no cervical adenopathy.   Neurological: She is alert and oriented to person, place, and time. No cranial nerve deficit or sensory deficit. She exhibits normal muscle tone. Coordination normal.   Skin: Skin is warm and dry. Rash noted. She is not diaphoretic. There is erythema. No pallor.   Psychiatric: She has a normal mood and affect. Her speech is normal and behavior is normal. Judgment and thought content normal. Cognition and memory are normal.   Nursing note and vitals reviewed.      Assessment/Plan     Brenton was seen today for med refill, foot pain and blister.    Diagnoses and all orders for this visit:    Acquired hypothyroidism  -     TSH; Future    Chronic neck pain  -     XR Spine Cervical 2 or 3 View;  Future    Migraine without aura and without status migrainosus, not intractable    Vesicular hand eczema    Plantar fasciitis of right foot    Generalized anxiety disorder    Other orders  -     DULoxetine (CYMBALTA) 60 MG capsule; Take 1 capsule by mouth Daily.  -     topiramate (TOPAMAX) 25 MG tablet; 1T PO QAM AND 2T QPM  -     levothyroxine (SYNTHROID, LEVOTHROID) 25 MCG tablet; Take 1 tablet by mouth Daily.  -     fluocinonide-emollient (LIDEX-E) 0.05 % cream; Apply  topically to the appropriate area as directed 2 (Two) Times a Day. Rash on hands  -     diclofenac (VOLTAREN) 1 % gel gel; Apply 4 g topically to the appropriate area as directed 3 (Three) Times a Day. To foot    Will contact patient with lab and x-ray results and I receive them, will determine further plan of action for neck pain and headaches at that time, advised patient follow-up for injection in heel if foot pain persists.  Answers for HPI/ROS submitted by the patient on 8/21/2020   What is the primary reason for your visit?: Other  Please describe your symptoms.: Blisters on both hands that continue to reappear.  Big patch located below thumb on palm. Popping up on fingers now.  Need medication refills.  Have you had these symptoms before?: No  How long have you been having these symptoms?: Greater than 2 weeks  Please list any medications you are currently taking for this condition.: None  Please describe any probable cause for these symptoms. : I can not think of what could cause them. They originally appeared about three months ago.

## 2020-08-31 ENCOUNTER — HOSPITAL ENCOUNTER (OUTPATIENT)
Dept: GENERAL RADIOLOGY | Facility: HOSPITAL | Age: 30
Discharge: HOME | End: 2020-08-31
Admitting: PHYSICIAN ASSISTANT

## 2020-08-31 ENCOUNTER — LAB (OUTPATIENT)
Dept: LAB | Facility: HOSPITAL | Age: 30
End: 2020-08-31

## 2020-08-31 DIAGNOSIS — G89.29 CHRONIC NECK PAIN: ICD-10-CM

## 2020-08-31 DIAGNOSIS — E03.9 ACQUIRED HYPOTHYROIDISM: ICD-10-CM

## 2020-08-31 DIAGNOSIS — M54.2 CHRONIC NECK PAIN: ICD-10-CM

## 2020-08-31 LAB — TSH SERPL DL<=0.05 MIU/L-ACNC: 2.06 UIU/ML (ref 0.27–4.2)

## 2020-08-31 PROCEDURE — 36415 COLL VENOUS BLD VENIPUNCTURE: CPT

## 2020-08-31 PROCEDURE — 72040 X-RAY EXAM NECK SPINE 2-3 VW: CPT

## 2020-08-31 PROCEDURE — 84443 ASSAY THYROID STIM HORMONE: CPT

## 2020-09-01 DIAGNOSIS — R51.9 CHRONIC INTRACTABLE HEADACHE, UNSPECIFIED HEADACHE TYPE: Primary | ICD-10-CM

## 2020-09-01 DIAGNOSIS — G89.29 CHRONIC INTRACTABLE HEADACHE, UNSPECIFIED HEADACHE TYPE: Primary | ICD-10-CM

## 2020-09-02 NOTE — TELEPHONE ENCOUNTER
79-year-old female returns for surveillance colonoscopy.  She had an adenomatous polyp last exam.  No major interval illnesses.  No family history of colon cancer or polyps the places her at higher risk.   ----- Message from Anastasiia Ambrocio sent at 12/27/2018  9:38 AM EST -----  Contact: [facility] [place]  CLARIFICATION NEEDED ON [facility]    [facility] [address] - [address] [place] [address] - [phone]  - [phone] (Phone)  [phone] (Fax)

## 2020-10-12 ENCOUNTER — TRANSCRIBE ORDERS (OUTPATIENT)
Dept: FAMILY MEDICINE CLINIC | Facility: CLINIC | Age: 30
End: 2020-10-12

## 2020-10-12 ENCOUNTER — TELEPHONE (OUTPATIENT)
Dept: FAMILY MEDICINE CLINIC | Facility: CLINIC | Age: 30
End: 2020-10-12

## 2020-10-12 DIAGNOSIS — K21.9 BILE ACID ESOPHAGEAL REFLUX: ICD-10-CM

## 2020-10-12 DIAGNOSIS — G89.29 CHRONIC RUQ PAIN: Primary | ICD-10-CM

## 2020-10-12 DIAGNOSIS — R13.19 OTHER DYSPHAGIA: ICD-10-CM

## 2020-10-12 DIAGNOSIS — R10.11 CHRONIC RUQ PAIN: Primary | ICD-10-CM

## 2020-10-12 NOTE — TELEPHONE ENCOUNTER
Okay, as patient is already had her gallbladder removed see if we can cancel that ultrasound of the gallbladder.  I would recommend referring her to gastroenterology for an EGD.  Diagnosis is abdominal pain with acid reflux and difficulty swallowing.

## 2020-10-12 NOTE — TELEPHONE ENCOUNTER
----- Message from Brenton Vila sent at 10/12/2020 11:45 AM EDT -----  Regarding: Non-Urgent Medical Question  Contact: [phone]  Mahnaz,   Sometime last week I started noticing I could feel my food and drink going down my esophagus. When it hit wherever it did I get burning in my chest with every bite. Starting Friday night I started feeling bloated and continued with above symptom. Saturday into Sunday anytime i laid down I would have bile come up. Last night I had bile come up and woke up with my chest burning significantly. Now when I drink bile occasionally comes up. I'm still bloated and experiencing all symptoms. Any suggestions besides antiacids?

## 2020-10-12 NOTE — TELEPHONE ENCOUNTER
Ultrasound in chart from 2016 looks like she probably had stones in her gallbladder.  If she still has her gallbladder that is my best guess and if she still has her gallbladder I would recommend we repeat an ultrasound specifically of the gallbladder and consider a HIDA scan as well.  In the meantime she can try taking Nexium 40 mg each morning over-the-counter and avoiding any fats or oil in her diet

## 2020-10-12 NOTE — TELEPHONE ENCOUNTER
----- Message from Brenton Vila sent at 10/12/2020  1:10 PM EDT -----  Regarding: RE: Non-Urgent Medical Question  Contact: [phone]  I no longer have my gallbladder. After having it removed I ended up having a stone in my bile duct. They said it is abnormally shaped. It is shaped like a U.

## 2020-10-19 ENCOUNTER — APPOINTMENT (OUTPATIENT)
Dept: PREADMISSION TESTING | Facility: HOSPITAL | Age: 30
End: 2020-10-19

## 2020-10-19 LAB — SARS-COV-2 RNA RESP QL NAA+PROBE: NOT DETECTED

## 2020-10-19 PROCEDURE — C9803 HOPD COVID-19 SPEC COLLECT: HCPCS

## 2020-10-19 PROCEDURE — U0004 COV-19 TEST NON-CDC HGH THRU: HCPCS

## 2021-03-17 ENCOUNTER — OFFICE VISIT (OUTPATIENT)
Dept: FAMILY MEDICINE CLINIC | Facility: CLINIC | Age: 31
End: 2021-03-17

## 2021-03-17 VITALS
BODY MASS INDEX: 39.86 KG/M2 | DIASTOLIC BLOOD PRESSURE: 66 MMHG | WEIGHT: 263 LBS | HEIGHT: 68 IN | HEART RATE: 81 BPM | SYSTOLIC BLOOD PRESSURE: 118 MMHG | TEMPERATURE: 97.3 F | OXYGEN SATURATION: 99 %

## 2021-03-17 DIAGNOSIS — G89.29 CHRONIC NECK PAIN: Primary | ICD-10-CM

## 2021-03-17 DIAGNOSIS — M54.2 CHRONIC NECK PAIN: Primary | ICD-10-CM

## 2021-03-17 DIAGNOSIS — E55.9 VITAMIN D DEFICIENCY: ICD-10-CM

## 2021-03-17 DIAGNOSIS — G43.009 MIGRAINE WITHOUT AURA AND WITHOUT STATUS MIGRAINOSUS, NOT INTRACTABLE: ICD-10-CM

## 2021-03-17 DIAGNOSIS — M51.26 LUMBAR HERNIATED DISC: ICD-10-CM

## 2021-03-17 DIAGNOSIS — E78.5 DYSLIPIDEMIA: ICD-10-CM

## 2021-03-17 PROCEDURE — 99214 OFFICE O/P EST MOD 30 MIN: CPT | Performed by: PHYSICIAN ASSISTANT

## 2021-03-17 RX ORDER — OMEGA-3-ACID ETHYL ESTERS 1 G/1
2 CAPSULE, LIQUID FILLED ORAL DAILY
Qty: 60 CAPSULE | Refills: 6 | Status: SHIPPED | OUTPATIENT
Start: 2021-03-17 | End: 2021-12-16

## 2021-03-17 RX ORDER — ERGOCALCIFEROL 1.25 MG/1
50000 CAPSULE ORAL WEEKLY
Qty: 5 CAPSULE | Refills: 11 | Status: SHIPPED | OUTPATIENT
Start: 2021-03-17

## 2021-03-19 RX ORDER — RIZATRIPTAN BENZOATE 10 MG/1
10 TABLET ORAL ONCE AS NEEDED
Qty: 12 TABLET | Refills: 11 | Status: SHIPPED | OUTPATIENT
Start: 2021-03-19 | End: 2022-08-02

## 2021-03-19 RX ORDER — TOPIRAMATE 25 MG/1
TABLET ORAL
Qty: 120 TABLET | Refills: 3 | Status: SHIPPED | OUTPATIENT
Start: 2021-03-19 | End: 2021-06-23 | Stop reason: DRUGHIGH

## 2021-03-19 NOTE — PROGRESS NOTES
Subjective   Brenton Vila is a 31 y.o. female  Migraine (follow up on migraines, req FMLA completion ) and Neck Pain (Follow up on neck/back pain, req FMLA completion)      History of Present Illness  Patient is pleasant 31-year-old white female comes in today to discuss persistent migraine headaches and chronic neck pain.  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  She states has been expensing 2-3 migraines per week lately.  She is on Topamax 50 mg at night and 25 mg in the morning daily and takes ibuprofen over-the-counter when she gets migraine it is not very effective.  She states he is having this to 3 days a week at work at times with migraines occur and she also has a lot of neck pain with her migraines.  She has history chronic back pain with a herniated disc in the past which has done well except flares if she lifts anything heavy and states she did have a flareup recently with that.  Not currently experiencing numbness tingling or pain into upper or lower extremities from spine.  Additionally, she is on labs done recently showed low vitamin D level and persistently high triglycerides.  The following portions of the patient's history were reviewed and updated as appropriate: allergies, current medications, past social history and problem list    Review of Systems   Constitutional: Negative for fatigue and unexpected weight change.   HENT: Negative for congestion, dental problem, postnasal drip, sinus pressure, sore throat, trouble swallowing and voice change.    Eyes: Negative for photophobia, pain and visual disturbance.   Respiratory: Negative for shortness of breath.    Gastrointestinal: Negative for nausea and vomiting.   Musculoskeletal: Positive for back pain, neck pain and neck stiffness.   Skin: Negative for rash and wound.   Neurological: Positive for headaches. Negative for dizziness, syncope, facial  asymmetry, speech difficulty, weakness, light-headedness and numbness.   Hematological: Negative for adenopathy.   Psychiatric/Behavioral: Negative for agitation, confusion, dysphoric mood and sleep disturbance. The patient is not nervous/anxious.        Objective     Vitals:    03/17/21 1606   BP: 118/66   Pulse: 81   Temp: 97.3 °F (36.3 °C)   SpO2: 99%       Physical Exam  Vitals and nursing note reviewed.   Constitutional:       General: She is not in acute distress.     Appearance: Normal appearance. She is well-developed. She is obese. She is not ill-appearing, toxic-appearing or diaphoretic.   HENT:      Head: Normocephalic and atraumatic.   Eyes:      Conjunctiva/sclera: Conjunctivae normal.      Pupils: Pupils are equal, round, and reactive to light.   Neck:      Thyroid: No thyromegaly.      Vascular: No JVD.      Trachea: No tracheal deviation.   Cardiovascular:      Rate and Rhythm: Normal rate and regular rhythm.   Pulmonary:      Effort: Pulmonary effort is normal. No respiratory distress.      Breath sounds: Normal breath sounds. No stridor.   Abdominal:      General: Bowel sounds are normal.      Palpations: Abdomen is soft.   Musculoskeletal:      Cervical back: Normal range of motion and neck supple. Spinous process tenderness and muscular tenderness present.      Lumbar back: Tenderness and bony tenderness present. No swelling, deformity or spasms. Decreased range of motion.   Lymphadenopathy:      Cervical: No cervical adenopathy.   Skin:     General: Skin is warm and dry.      Coloration: Skin is not pale.   Neurological:      Mental Status: She is alert and oriented to person, place, and time.      Cranial Nerves: No cranial nerve deficit.      Sensory: No sensory deficit.      Motor: No weakness.      Coordination: Coordination normal.      Gait: Gait normal.      Deep Tendon Reflexes: Reflexes are normal and symmetric.   Psychiatric:         Mood and Affect: Mood normal.         Speech: Speech  normal.         Behavior: Behavior normal.         Thought Content: Thought content normal.         Judgment: Judgment normal.         Assessment/Plan     Diagnoses and all orders for this visit:    1. Chronic neck pain (Primary)    2. Migraine without aura and without status migrainosus, not intractable    3. Lumbar herniated disc    4. Dyslipidemia    5. Vitamin D deficiency    Other orders  -     vitamin D (ERGOCALCIFEROL) 1.25 MG (36355 UT) capsule capsule; Take 1 capsule by mouth 1 (One) Time Per Week.  Dispense: 5 capsule; Refill: 11  -     omega-3 acid ethyl esters (Lovaza) 1 g capsule; Take 2 capsules by mouth Daily. For high cholesterol  Dispense: 60 capsule; Refill: 6  -     rizatriptan (Maxalt) 10 MG tablet; Take 1 tablet by mouth 1 (One) Time As Needed for Migraine for up to 1 dose. May repeat in 2 hours if needed  Dispense: 12 tablet; Refill: 11  -     topiramate (TOPAMAX) 25 MG tablet; 2T PO QAM AND 2T QPM, new dose for migraines  Dispense: 120 tablet; Refill: 3    FMLA forms filled out follow-up in 1 month for recheck.  Answers for HPI/ROS submitted by the patient on 3/10/2021  Please describe your symptoms.: FmLa paperwork for headache and back pain  Have you had these symptoms before?: Yes  How long have you been having these symptoms?: Greater than 2 weeks  Please list any medications you are currently taking for this condition.: Levathyroxine 25 mg cymbalta 60mg topiramate 25mg × 3 suboxene ×1.5 vitamin d  Please describe any probable cause for these symptoms. : My migraines and headaches have increased over the past 6 months. Needing to get FMLA paperwork filled out for work.  What is the primary reason for your visit?: Other

## 2021-05-11 ENCOUNTER — TELEPHONE (OUTPATIENT)
Dept: FAMILY MEDICINE CLINIC | Facility: CLINIC | Age: 31
End: 2021-05-11

## 2021-05-11 DIAGNOSIS — G89.29 CHRONIC NONINTRACTABLE HEADACHE, UNSPECIFIED HEADACHE TYPE: Primary | ICD-10-CM

## 2021-05-11 DIAGNOSIS — R51.9 CHRONIC NONINTRACTABLE HEADACHE, UNSPECIFIED HEADACHE TYPE: Primary | ICD-10-CM

## 2021-05-11 NOTE — TELEPHONE ENCOUNTER
----- Message from Brenton Vila sent at 5/11/2021  8:47 AM EDT -----  Regarding: Visit Follow-Up Question  Contact: [phone]  Erik,  Is it possible for you to revise my FMLA and make it 3x a week. Though I am not calling in, if I leave early that counts as a day and I am trying to avoid getting in trouble for coming to work even though I have a headache. The weather isn't helping it any as well.

## 2021-05-11 NOTE — TELEPHONE ENCOUNTER
Please notify patient I cannot increase the FMLA to more than twice a week but I can refer her to neurology and sounds like that is what needs to be done if she is getting that many headaches.  I am going to place an order for neurology to contact her for an appt

## 2021-06-23 ENCOUNTER — LAB (OUTPATIENT)
Dept: LAB | Facility: HOSPITAL | Age: 31
End: 2021-06-23

## 2021-06-23 ENCOUNTER — OFFICE VISIT (OUTPATIENT)
Dept: NEUROLOGY | Facility: CLINIC | Age: 31
End: 2021-06-23

## 2021-06-23 VITALS
OXYGEN SATURATION: 96 % | SYSTOLIC BLOOD PRESSURE: 114 MMHG | DIASTOLIC BLOOD PRESSURE: 70 MMHG | BODY MASS INDEX: 40.32 KG/M2 | WEIGHT: 266 LBS | HEART RATE: 83 BPM | HEIGHT: 68 IN

## 2021-06-23 DIAGNOSIS — G43.709 CHRONIC MIGRAINE WITHOUT AURA WITHOUT STATUS MIGRAINOSUS, NOT INTRACTABLE: Primary | ICD-10-CM

## 2021-06-23 DIAGNOSIS — Z87.891 PERSONAL HISTORY OF TOBACCO USE: ICD-10-CM

## 2021-06-23 DIAGNOSIS — G43.709 CHRONIC MIGRAINE WITHOUT AURA WITHOUT STATUS MIGRAINOSUS, NOT INTRACTABLE: ICD-10-CM

## 2021-06-23 DIAGNOSIS — R26.89 POOR BALANCE: ICD-10-CM

## 2021-06-23 PROBLEM — B19.20 HEPATITIS C VIRUS INFECTION: Status: ACTIVE | Noted: 2021-06-23

## 2021-06-23 PROBLEM — F11.11 HISTORY OF OPIOID ABUSE: Status: ACTIVE | Noted: 2021-06-23

## 2021-06-23 PROBLEM — E78.5 HYPERLIPIDEMIA: Status: ACTIVE | Noted: 2021-06-23

## 2021-06-23 PROBLEM — K76.0 STEATOSIS OF LIVER: Status: ACTIVE | Noted: 2021-06-23

## 2021-06-23 PROBLEM — E66.01 MORBID OBESITY (HCC): Status: ACTIVE | Noted: 2021-06-23

## 2021-06-23 PROBLEM — F17.200 SMOKES TOBACCO DAILY: Status: ACTIVE | Noted: 2021-06-23

## 2021-06-23 LAB
25(OH)D3 SERPL-MCNC: 26.2 NG/ML (ref 30–100)
ALBUMIN SERPL-MCNC: 4.5 G/DL (ref 3.5–5.2)
ALBUMIN/GLOB SERPL: 1.6 G/DL
ALP SERPL-CCNC: 74 U/L (ref 39–117)
ALT SERPL W P-5'-P-CCNC: 29 U/L (ref 1–33)
ANION GAP SERPL CALCULATED.3IONS-SCNC: 8.4 MMOL/L (ref 5–15)
AST SERPL-CCNC: 35 U/L (ref 1–32)
BASOPHILS # BLD AUTO: 0.05 10*3/MM3 (ref 0–0.2)
BASOPHILS NFR BLD AUTO: 0.5 % (ref 0–1.5)
BILIRUB SERPL-MCNC: 0.2 MG/DL (ref 0–1.2)
BUN SERPL-MCNC: 10 MG/DL (ref 6–20)
BUN/CREAT SERPL: 14.3 (ref 7–25)
CALCIUM SPEC-SCNC: 9.2 MG/DL (ref 8.6–10.5)
CHLORIDE SERPL-SCNC: 104 MMOL/L (ref 98–107)
CHROMATIN AB SERPL-ACNC: <10 IU/ML (ref 0–14)
CO2 SERPL-SCNC: 25.6 MMOL/L (ref 22–29)
CREAT SERPL-MCNC: 0.7 MG/DL (ref 0.57–1)
CRP SERPL-MCNC: 0.87 MG/DL (ref 0–0.5)
DEPRECATED RDW RBC AUTO: 45.8 FL (ref 37–54)
EOSINOPHIL # BLD AUTO: 0.28 10*3/MM3 (ref 0–0.4)
EOSINOPHIL NFR BLD AUTO: 2.7 % (ref 0.3–6.2)
ERYTHROCYTE [DISTWIDTH] IN BLOOD BY AUTOMATED COUNT: 13.5 % (ref 12.3–15.4)
ERYTHROCYTE [SEDIMENTATION RATE] IN BLOOD: 25 MM/HR (ref 0–20)
GFR SERPL CREATININE-BSD FRML MDRD: 98 ML/MIN/1.73
GLOBULIN UR ELPH-MCNC: 2.8 GM/DL
GLUCOSE SERPL-MCNC: 89 MG/DL (ref 65–99)
HBA1C MFR BLD: 5.56 % (ref 4.8–5.6)
HCT VFR BLD AUTO: 40.7 % (ref 34–46.6)
HGB BLD-MCNC: 14 G/DL (ref 12–15.9)
IMM GRANULOCYTES # BLD AUTO: 0.05 10*3/MM3 (ref 0–0.05)
IMM GRANULOCYTES NFR BLD AUTO: 0.5 % (ref 0–0.5)
LYMPHOCYTES # BLD AUTO: 3.03 10*3/MM3 (ref 0.7–3.1)
LYMPHOCYTES NFR BLD AUTO: 29.2 % (ref 19.6–45.3)
MCH RBC QN AUTO: 31.5 PG (ref 26.6–33)
MCHC RBC AUTO-ENTMCNC: 34.4 G/DL (ref 31.5–35.7)
MCV RBC AUTO: 91.7 FL (ref 79–97)
MONOCYTES # BLD AUTO: 0.79 10*3/MM3 (ref 0.1–0.9)
MONOCYTES NFR BLD AUTO: 7.6 % (ref 5–12)
NEUTROPHILS NFR BLD AUTO: 59.5 % (ref 42.7–76)
NEUTROPHILS NFR BLD AUTO: 6.19 10*3/MM3 (ref 1.7–7)
NRBC BLD AUTO-RTO: 0 /100 WBC (ref 0–0.2)
PLATELET # BLD AUTO: 310 10*3/MM3 (ref 140–450)
PMV BLD AUTO: 9.9 FL (ref 6–12)
POTASSIUM SERPL-SCNC: 4.2 MMOL/L (ref 3.5–5.2)
PROT SERPL-MCNC: 7.3 G/DL (ref 6–8.5)
RBC # BLD AUTO: 4.44 10*6/MM3 (ref 3.77–5.28)
SODIUM SERPL-SCNC: 138 MMOL/L (ref 136–145)
T4 FREE SERPL-MCNC: 0.72 NG/DL (ref 0.93–1.7)
TSH SERPL DL<=0.05 MIU/L-ACNC: 1.55 UIU/ML (ref 0.27–4.2)
VIT B12 BLD-MCNC: 459 PG/ML (ref 211–946)
WBC # BLD AUTO: 10.39 10*3/MM3 (ref 3.4–10.8)

## 2021-06-23 PROCEDURE — 36415 COLL VENOUS BLD VENIPUNCTURE: CPT

## 2021-06-23 PROCEDURE — 83036 HEMOGLOBIN GLYCOSYLATED A1C: CPT

## 2021-06-23 PROCEDURE — 86140 C-REACTIVE PROTEIN: CPT

## 2021-06-23 PROCEDURE — 86038 ANTINUCLEAR ANTIBODIES: CPT

## 2021-06-23 PROCEDURE — 99214 OFFICE O/P EST MOD 30 MIN: CPT | Performed by: NURSE PRACTITIONER

## 2021-06-23 PROCEDURE — 84439 ASSAY OF FREE THYROXINE: CPT

## 2021-06-23 PROCEDURE — 86431 RHEUMATOID FACTOR QUANT: CPT

## 2021-06-23 PROCEDURE — 85652 RBC SED RATE AUTOMATED: CPT

## 2021-06-23 PROCEDURE — 82607 VITAMIN B-12: CPT

## 2021-06-23 PROCEDURE — 85025 COMPLETE CBC W/AUTO DIFF WBC: CPT

## 2021-06-23 PROCEDURE — 84443 ASSAY THYROID STIM HORMONE: CPT

## 2021-06-23 PROCEDURE — 80053 COMPREHEN METABOLIC PANEL: CPT

## 2021-06-23 PROCEDURE — 82306 VITAMIN D 25 HYDROXY: CPT

## 2021-06-23 RX ORDER — TOPIRAMATE 50 MG/1
TABLET, FILM COATED ORAL
Qty: 60 TABLET | Refills: 2 | Status: SHIPPED | OUTPATIENT
Start: 2021-06-23 | End: 2021-11-15

## 2021-06-23 NOTE — PROGRESS NOTES
Subjective:     Patient ID: Brenton Vila is a 31 y.o. female.    CC:   Chief Complaint   Patient presents with   • Headache       HPI:   History of Present Illness-PCP Mahnaz Dutton.  Chronic migraines. On TPM. Maxalt helps if she takes it soon enough.    Mendoza started age 24 after daughter was born. Started on TPM 5 years ago. Taking TPM 50mg bid.  At that times was having 1-2 week and were not severe. TPM did help HA at that time.    In the last year 6 months begin to have HA 3-4/week. Pain is top of head to behind eye. Pain starts dull and begins to throb. Neck will be tight. Has nausea. No visual changes. Head will get heavy before pain begins. Has phonophobia and occ photophobia. Pain is 6-7/10.  Start between noon and 3pm. She would have to leave work, lay down.  Has been in ER for HA last year. No scan. Treated with injection.   Started Maxalt one month ago. Has used it 24 tabs. It is effective if she takes it early enough. Denies whooshing sound. Gained 50 pounds in 6 months after birth of daughter.    Both parents with migraines.     Takes Cymbalta for anxiety and sciatica. Moods are stable.      Has been having problems with balance for last 6 months. Occasionally has sinus issues. N/T in feet if she sits too long. Goes away when she stands up. No changes in bladder bowels. Has had excessive fatigue for one year. Can sleep any where. She does snore.  Has gained 5 pounds in last year.       The following portions of the patient's history were reviewed and updated as appropriate: allergies, current medications, past family history, past medical history, past social history, past surgical history and problem list.      Current Outpatient Medications:   •  buprenorphine-naloxone (SUBOXONE) 8-2 MG per SL tablet, buprenorphine 8 mg-naloxone 2 mg sublingual tablet, Disp: , Rfl:   •  DULoxetine (CYMBALTA) 60 MG capsule, Take 1 capsule by mouth Daily., Disp: 30 capsule, Rfl: 11  •  levothyroxine (SYNTHROID,  LEVOTHROID) 25 MCG tablet, Take 1 tablet by mouth Daily., Disp: 90 tablet, Rfl: 3  •  omega-3 acid ethyl esters (Lovaza) 1 g capsule, Take 2 capsules by mouth Daily. For high cholesterol, Disp: 60 capsule, Rfl: 6  •  rizatriptan (Maxalt) 10 MG tablet, Take 1 tablet by mouth 1 (One) Time As Needed for Migraine for up to 1 dose. May repeat in 2 hours if needed, Disp: 12 tablet, Rfl: 11  •  vitamin D (ERGOCALCIFEROL) 1.25 MG (84432 UT) capsule capsule, Take 1 capsule by mouth 1 (One) Time Per Week., Disp: 5 capsule, Rfl: 11  •  topiramate (Topamax) 50 MG tablet, 2 in am 1 pm x 7d, then 2  bid, Disp: 60 tablet, Rfl: 2     Past Medical History:   Diagnosis Date   • Acquired hypothyroidism 02/2017   • Anxiety    • Arthritis    • History of PCOS 01/2015   • Hyperlipidemia 2015    Off meds ~ 2016   • Lumbar herniated disc 2018    L2-L3 & L4-L5   • Migraine    • Thyroid disease        Past Surgical History:   Procedure Laterality Date   • ENDOSCOPY  06/2016    Removal bile duct stone   • LAPAROSCOPIC CHOLECYSTECTOMY  04/2016       Social History     Socioeconomic History   • Marital status:      Spouse name: Not on file   • Number of children: Not on file   • Years of education: Not on file   • Highest education level: Not on file   Tobacco Use   • Smoking status: Current Some Day Smoker     Packs/day: 0.50     Types: Cigarettes     Start date: 2007     Last attempt to quit: 7/27/2017     Years since quitting: 3.9   • Smokeless tobacco: Never Used   Vaping Use   • Vaping Use: Never used   Substance and Sexual Activity   • Alcohol use: No   • Drug use: Not Currently     Comment: hydrocodone/oxycodone. On Suboxone for 1 year   • Sexual activity: Yes     Partners: Male     Birth control/protection: None       Family History   Problem Relation Age of Onset   • Breast cancer Paternal Grandmother    • Arthritis Paternal Grandmother    • Epilepsy Paternal Grandmother    • Heart disease Paternal Grandmother    • Hypertension  "Paternal Grandmother    • Hyperlipidemia Paternal Grandmother    • Suicide Attempts Paternal Grandmother    • Osteoporosis Paternal Grandmother    • Seizures Paternal Grandmother    • Stroke Paternal Grandmother    • Thyroid disease Paternal Grandmother    • Arthritis Mother    • Alcohol abuse Mother    • Hypertension Mother    • Hyperlipidemia Mother    • Migraines Mother    • Suicide Attempts Mother    • Thyroid disease Mother    • Hypertension Father    • Hyperlipidemia Father    • Mental illness Father    • Suicide Attempts Father    • Neuropathy Father    • Migraines Father    • Aneurysm Paternal Uncle    • Arthritis Paternal Uncle    • Arthritis Maternal Grandmother    • Heart disease Maternal Grandmother    • Hypertension Maternal Grandmother    • Hyperlipidemia Maternal Grandmother    • Suicide Attempts Maternal Grandmother    • Thyroid disease Maternal Grandmother    • Arthritis Maternal Grandfather    • Heart disease Maternal Grandfather    • Hypertension Maternal Grandfather    • Hyperlipidemia Maternal Grandfather    • Arthritis Paternal Grandfather    • Heart disease Paternal Grandfather    • Hypertension Paternal Grandfather    • Hyperlipidemia Paternal Grandfather    • Osteoporosis Paternal Grandfather           Objective:  /70   Pulse 83   Ht 172.7 cm (67.99\")   Wt 121 kg (266 lb)   SpO2 96%   BMI 40.45 kg/m²     Neurologic Exam     Mental Status   Oriented to person, place, and time.   Follows 3 step commands.   Attention: normal. Concentration: normal.   Speech: speech is normal   Level of consciousness: alert  Knowledge: consistent with education.   Normal comprehension.     Cranial Nerves     CN III, IV, VI   Pupils are equal, round, and reactive to light.  Right pupil: Accommodation: intact.   Left pupil: Accommodation: intact.   CN III: no CN III palsy  CN VI: no CN VI palsy  Nystagmus: bilateral   Nystagmus type: vertical  Diplopia: none  Upgaze: normal  Downgaze: normal  Conjugate " gaze: present    CN VII   Facial expression full, symmetric.     CN VIII   Hearing: intact    CN XII   CN XII normal.     Motor Exam   Muscle bulk: normal  Overall muscle tone: normal    Strength   Right biceps: 5/5  Left biceps: 5/5  Right triceps: 5/5  Left triceps: 5/5  Right interossei: 5/5  Left interossei: 5/5  Right quadriceps: 5/5  Left quadriceps: 5/5  Right anterior tibial: 5/5  Left anterior tibial: 5/5  Right posterior tibial: 5/5  Left posterior tibial: 5/5    Sensory Exam   Light touch normal.     Gait, Coordination, and Reflexes     Gait  Gait: normal    Coordination   Romberg: negative  Finger to nose coordination: normal  Heel to shin coordination: normal    Tremor   Resting tremor: absent  Action tremor: absent    Reflexes   Right brachioradialis: 2+  Left brachioradialis: 2+  Right biceps: 2+  Left biceps: 2+  Right triceps: 2+  Left triceps: 2+  Right patellar: 2+  Left patellar: 2+  Right achilles: 2+  Left achilles: 2+  Right : 2+  Left : 2+      Physical Exam  Eyes:      Pupils: Pupils are equal, round, and reactive to light.   Neurological:      Mental Status: She is oriented to person, place, and time.      Coordination: Finger-Nose-Finger Test, Heel to Shin Test and Romberg Test normal.      Gait: Gait is intact.      Deep Tendon Reflexes:      Reflex Scores:       Tricep reflexes are 2+ on the right side and 2+ on the left side.       Bicep reflexes are 2+ on the right side and 2+ on the left side.       Brachioradialis reflexes are 2+ on the right side and 2+ on the left side.       Patellar reflexes are 2+ on the right side and 2+ on the left side.       Achilles reflexes are 2+ on the right side and 2+ on the left side.  Psychiatric:         Speech: Speech normal.            Assessment/Plan:       Diagnoses and all orders for this visit:    1. Chronic migraine without aura without status migrainosus, not intractable (Primary)  Comments:  She is reluctant to move to injections at  this time. Increase dose of TPM and obtain MRI. If no relief will re-visit [facility] or move to [facility] for an oral option.  Orders:  -     MRI Brain With & Without Contrast; Future  -     CBC & Differential; Future  -     Comprehensive Metabolic Panel; Future  -     Hemoglobin A1c; Future  -     T4, Free; Future  -     TSH; Future  -     Vitamin D 25 Hydroxy; Future  -     Vitamin B12; Future  -     MARGARITO With / DsDNA, RNP, Sjogrens A / B, Smith; Future  -     C-reactive Protein; Future  -     Rheumatoid Factor; Future  -     Sedimentation Rate; Future  -     topiramate (Topamax) 50 MG tablet; 2 in am 1 pm x 7d, then 2  bid  Dispense: 60 tablet; Refill: 2    2. Personal history of tobacco use  Comments:  Encourage cessation    3. Poor balance  -     MRI Brain With & Without Contrast; Future  -     CBC & Differential; Future  -     Comprehensive Metabolic Panel; Future  -     Hemoglobin A1c; Future  -     T4, Free; Future  -     TSH; Future  -     Vitamin D 25 Hydroxy; Future  -     Vitamin B12; Future  -     MARGARITO With / DsDNA, RNP, Sjogrens A / B, Smith; Future  -     C-reactive Protein; Future  -     Rheumatoid Factor; Future  -     Sedimentation Rate; Future         We reviewed possible headache triggers, stress relief techniques, and alternative treatments for headaches. The patient was in understanding and all questions were addressed. We reviewed the diagnosis and treatment plan and medication side effect profile. The patient will follow up as scheduled.    Reviewed medications, potential side effects and signs and symptoms to report. Discussed risk versus benefits of treatment plan with patient and/or family-including medications, labs and radiology that may be ordered. Addressed questions and concerns during visit. Patient and/or family verbalized understanding and agree with plan. Instructed to call the office with any questions and report to ER with any life-threatening symptoms.    During this visit the following  were done:  Labs Reviewed []    Labs Ordered [x]    Radiology Reports Reviewed []    Radiology Ordered [x]    PCP Records Reviewed [x]    Referring Provider Records Reviewed []    ER Records Reviewed []    Hospital Records Reviewed []    History Obtained From Family []    Radiology Images Reviewed []    Other Reviewed []    Records Requested []      Zaid Bobo, KATIUSKA, APRN  6/23/2021

## 2021-06-24 LAB — ANA SER QL: NEGATIVE

## 2021-06-25 ENCOUNTER — TELEPHONE (OUTPATIENT)
Dept: NEUROLOGY | Facility: CLINIC | Age: 31
End: 2021-06-25

## 2021-06-25 NOTE — TELEPHONE ENCOUNTER
Zaid Bobo, KATIUSKA, Rona Rodriguez, MA  Please call pt. Labs show general inflammation, low vitamin D. She can take an OTC vit D 2000 u supplement daily.  Repeat lab with PCP in 3 months. No diabetes, infection or anemia. One thyroid test is abnormal but not significant at this time since TSH is nml. One liver test is slightly elevated which is not significant at this time.

## 2021-06-25 NOTE — TELEPHONE ENCOUNTER
Relayed results/recomendation per Tuan via patient's voicemail system as she did not answer. Office # given if any further questions.

## 2021-07-14 ENCOUNTER — TELEPHONE (OUTPATIENT)
Dept: FAMILY MEDICINE CLINIC | Facility: CLINIC | Age: 31
End: 2021-07-14

## 2021-07-14 RX ORDER — PERMETHRIN 0.25 %
SPRAY, NON-AEROSOL (ML) MISCELLANEOUS ONCE
Qty: 120 ML | Refills: 1 | Status: SHIPPED | OUTPATIENT
Start: 2021-07-14 | End: 2021-07-14

## 2021-07-14 NOTE — TELEPHONE ENCOUNTER
----- Message from Brenton Vila sent at 7/14/2021  8:06 AM EDT -----  Regarding: Prescription Question  Contact: [phone]  My daughter contracted lice somewhere and has been treated, but now I have them. Her doctor called in a prescription ointment. Can you call me in something to treat with

## 2021-07-19 ENCOUNTER — TELEPHONE (OUTPATIENT)
Dept: NEUROLOGY | Facility: CLINIC | Age: 31
End: 2021-07-19

## 2021-07-19 NOTE — TELEPHONE ENCOUNTER
HUB UNABLE TO SCHEDULE ANY APPTS WITH BATSHEVA MARTÍNEZ UNTIL WE RECEIVE AN UPDATED REFERENCE TOOL.    PLEASE CONTACT PT TO SCHEDULE APPT.    THANK YOU.

## 2021-07-19 NOTE — TELEPHONE ENCOUNTER
Caller: Brenton Vila    Relationship: Self    Best call back number: [phone]    What was the call regarding: PT IS SCHEDULED FOR F/U APPT WITH JOSELYN THIS WEDNESDAT, 7/21/21. PT IS NOT SCHEDULED TO HAVE HER MRI COMPLETED UNTIL 7/26/21. PT NEEDS APPT TO BE RESCHEDULED ONCE HER MRI IS COMPLETED. PLEASE CONTACT PT TO RESCHEDULE APPT.    Do you require a callback: YES, PLEASE.    PLEASE REVIEW AND ADVISE.

## 2021-07-21 ENCOUNTER — TELEPHONE (OUTPATIENT)
Dept: NEUROLOGY | Facility: CLINIC | Age: 31
End: 2021-07-21

## 2021-07-21 NOTE — TELEPHONE ENCOUNTER
Regarding: FW: Non-Urgent Medical Question  Contact: [phone]  It is okay to change her FMLA to 3 days. She has appt tomorrow, but mri has not been performed.  ----- Message -----  From: Kt Quach MA  Sent: 7/20/2021   1:53 PM EDT  To: Zaid Bobo DNP, APRN  Subject: Non-Urgent Medical Question                      ----- Message from Kt Quach MA sent at 7/20/2021  1:53 PM EDT -----       ----- Message from Brenton Vila to Zaid Bobo DNP, APRN sent at 7/20/2021  5:42 AM -----   Miss. Bobo,  I am on FMLA and it is currently for two days a week. My employer counts it as one day rather I call in or leave an hour early. We work ten hour shifts 5-6 days a week and I was wondering if you could adjust my allotted time to three days instead of two. I am taking all medication as directed and the headaches are still coming.   Thanks

## 2021-07-21 NOTE — TELEPHONE ENCOUNTER
Hey. I did not see paperwork scanned in to change dates, and unfortunately, I am not at [facility] today. Would you happen to have forms? If so, can you make this change?

## 2021-07-26 ENCOUNTER — HOSPITAL ENCOUNTER (OUTPATIENT)
Dept: MRI IMAGING | Facility: HOSPITAL | Age: 31
Discharge: HOME | End: 2021-07-26
Admitting: NURSE PRACTITIONER

## 2021-07-26 DIAGNOSIS — R26.89 POOR BALANCE: ICD-10-CM

## 2021-07-26 DIAGNOSIS — G43.709 CHRONIC MIGRAINE WITHOUT AURA WITHOUT STATUS MIGRAINOSUS, NOT INTRACTABLE: ICD-10-CM

## 2021-07-26 PROCEDURE — 0 GADOBENATE DIMEGLUMINE 529 MG/ML SOLUTION: Performed by: NURSE PRACTITIONER

## 2021-07-26 PROCEDURE — 70553 MRI BRAIN STEM W/O & W/DYE: CPT

## 2021-07-26 PROCEDURE — A9577 INJ MULTIHANCE: HCPCS | Performed by: NURSE PRACTITIONER

## 2021-07-26 RX ADMIN — GADOBENATE DIMEGLUMINE 20 ML: 529 INJECTION, SOLUTION INTRAVENOUS at 15:20

## 2021-07-27 ENCOUNTER — OFFICE VISIT (OUTPATIENT)
Dept: NEUROLOGY | Facility: CLINIC | Age: 31
End: 2021-07-27

## 2021-07-27 VITALS
SYSTOLIC BLOOD PRESSURE: 116 MMHG | HEART RATE: 74 BPM | HEIGHT: 68 IN | OXYGEN SATURATION: 98 % | DIASTOLIC BLOOD PRESSURE: 72 MMHG | WEIGHT: 269 LBS | BODY MASS INDEX: 40.77 KG/M2

## 2021-07-27 DIAGNOSIS — G43.709 CHRONIC MIGRAINE WITHOUT AURA WITHOUT STATUS MIGRAINOSUS, NOT INTRACTABLE: Primary | ICD-10-CM

## 2021-07-27 PROCEDURE — 99214 OFFICE O/P EST MOD 30 MIN: CPT | Performed by: NURSE PRACTITIONER

## 2021-07-27 RX ORDER — METOPROLOL TARTRATE 50 MG/1
50 TABLET, FILM COATED ORAL DAILY
Qty: 30 TABLET | Refills: 11 | Status: SHIPPED | OUTPATIENT
Start: 2021-07-27 | End: 2022-08-02

## 2021-07-27 NOTE — PROGRESS NOTES
Headache New Office Visit      Encounter Date: 2021   Patient Name: Brenton Vila  : 1990   MRN: [id number]     Chief Complaint:    Chief Complaint   Patient presents with   • Migraine    Last visit 21 w me-obtain MRI/labs and increase dose of TPM, consider Nurtec  Labs w low vit D, sed rate elevated  MRI Brain With & Without Contrast (2021 15:26)    History of Present Illness: Brenton Vila is a 31 y.o. female who is here today for evaluation of headaches and poor balance    HA  She had lethargy with TPM 2in am 1 pm. Now taking 1 am and 2 pm.  Having HA 3-4 times a week, but rescue med is effective after 2nd dose.    Headache Symptoms:   Days per month: 9-12  Location: [place] , Right Eye and Left Eye      Quality: Throbbing        Duration: 6-12 hours  Severity: 6/10  Triggers: stress  Associated Symptoms: Nausea, Photophobia, Phonophobia and  Vision changes blurred  Aura: head gets heavy      Abortives;Maxalt  Preventives: TPM, Cymbalta                               Balance                                                 Feel off balance last 6 months. Attributes to allergies and sinus issues. N/T of feet improves if she stands. No change in B/B. Does have fatigue. Snores.    Subjective      Past Medical History:   Past Medical History:   Diagnosis Date   • Acquired hypothyroidism 2017   • Anxiety    • Arthritis    • History of PCOS 2015   • Hyperlipidemia 2015    Off meds ~    • Lumbar herniated disc 2018    L2-L3 & L4-L5   • Migraine    • Thyroid disease        Past Surgical History:   Past Surgical History:   Procedure Laterality Date   • ENDOSCOPY  2016    Removal bile duct stone   • LAPAROSCOPIC CHOLECYSTECTOMY  2016       Family History:   Family History   Problem Relation Age of Onset   • Breast cancer Paternal Grandmother    • Arthritis Paternal Grandmother    • Epilepsy Paternal Grandmother    • Heart disease Paternal Grandmother    • Hypertension Paternal  Grandmother    • Hyperlipidemia Paternal Grandmother    • Suicide Attempts Paternal Grandmother    • Osteoporosis Paternal Grandmother    • Seizures Paternal Grandmother    • Stroke Paternal Grandmother    • Thyroid disease Paternal Grandmother    • Arthritis Mother    • Alcohol abuse Mother    • Hypertension Mother    • Hyperlipidemia Mother    • Migraines Mother    • Suicide Attempts Mother    • Thyroid disease Mother    • Hypertension Father    • Hyperlipidemia Father    • Mental illness Father    • Suicide Attempts Father    • Neuropathy Father    • Migraines Father    • Aneurysm Paternal Uncle    • Arthritis Paternal Uncle    • Arthritis Maternal Grandmother    • Heart disease Maternal Grandmother    • Hypertension Maternal Grandmother    • Hyperlipidemia Maternal Grandmother    • Suicide Attempts Maternal Grandmother    • Thyroid disease Maternal Grandmother    • Arthritis Maternal Grandfather    • Heart disease Maternal Grandfather    • Hypertension Maternal Grandfather    • Hyperlipidemia Maternal Grandfather    • Arthritis Paternal Grandfather    • Heart disease Paternal Grandfather    • Hypertension Paternal Grandfather    • Hyperlipidemia Paternal Grandfather    • Osteoporosis Paternal Grandfather        Social History:   Social History     Socioeconomic History   • Marital status:      Spouse name: Not on file   • Number of children: Not on file   • Years of education: Not on file   • Highest education level: Not on file   Tobacco Use   • Smoking status: Current Some Day Smoker     Packs/day: 0.50     Types: Cigarettes     Start date:      Last attempt to quit: 2017     Years since quittin.0   • Smokeless tobacco: Never Used   Vaping Use   • Vaping Use: Never used   Substance and Sexual Activity   • Alcohol use: No   • Drug use: Not Currently     Comment: hydrocodone/oxycodone. On Suboxone for 1 year   • Sexual activity: Yes     Partners: Male     Birth control/protection: None        Medications:     Current Outpatient Medications:   •  buprenorphine-naloxone (SUBOXONE) 8-2 MG per SL tablet, buprenorphine 8 mg-naloxone 2 mg sublingual tablet, Disp: , Rfl:   •  DULoxetine (CYMBALTA) 60 MG capsule, Take 1 capsule by mouth Daily., Disp: 30 capsule, Rfl: 11  •  levothyroxine (SYNTHROID, LEVOTHROID) 25 MCG tablet, Take 1 tablet by mouth Daily., Disp: 90 tablet, Rfl: 3  •  omega-3 acid ethyl esters (Lovaza) 1 g capsule, Take 2 capsules by mouth Daily. For high cholesterol, Disp: 60 capsule, Rfl: 6  •  rizatriptan (Maxalt) 10 MG tablet, Take 1 tablet by mouth 1 (One) Time As Needed for Migraine for up to 1 dose. May repeat in 2 hours if needed, Disp: 12 tablet, Rfl: 11  •  topiramate (Topamax) 50 MG tablet, 2 in am 1 pm x 7d, then 2  bid, Disp: 60 tablet, Rfl: 2  •  vitamin D (ERGOCALCIFEROL) 1.25 MG (44500 UT) capsule capsule, Take 1 capsule by mouth 1 (One) Time Per Week., Disp: 5 capsule, Rfl: 11  •  metoprolol tartrate (Lopressor) 50 MG tablet, Take 1 tablet by mouth Daily., Disp: 30 tablet, Rfl: 11    Allergies:   No Known Allergies    PHQ-9 Total Score:     STEADI Fall Risk Assessment has not been completed.    Objective     Physical Exam:   Physical Exam  Eyes:      Pupils: Pupils are equal, round, and reactive to light.   Neurological:      Mental Status: She is oriented to person, place, and time.      Gait: Gait is intact.      Deep Tendon Reflexes:      Reflex Scores:       Tricep reflexes are 2+ on the right side and 2+ on the left side.       Bicep reflexes are 2+ on the right side and 2+ on the left side.       Brachioradialis reflexes are 2+ on the right side and 2+ on the left side.       Patellar reflexes are 2+ on the right side and 2+ on the left side.       Achilles reflexes are 2+ on the right side and 2+ on the left side.  Psychiatric:         Speech: Speech normal.         Neurologic Exam     Mental Status   Oriented to person, place, and time.   Follows 3 step commands.  "  Attention: normal. Concentration: normal.   Speech: speech is normal   Level of consciousness: alert  Knowledge: consistent with education.   Normal comprehension.     Cranial Nerves     CN III, IV, VI   Pupils are equal, round, and reactive to light.  Right pupil: Accommodation: intact.   Left pupil: Accommodation: intact.   CN III: no CN III palsy  CN VI: no CN VI palsy  Nystagmus: none   Diplopia: none  Upgaze: normal  Downgaze: normal  Conjugate gaze: present    CN XII   CN XII normal.     Motor Exam   Muscle bulk: normal  Overall muscle tone: normal    Strength   Right biceps: 5/5  Left biceps: 5/5  Right triceps: 5/5  Left triceps: 5/5  Right interossei: 5/5  Left interossei: 5/5  Right quadriceps: 5/5  Left quadriceps: 5/5  Right anterior tibial: 5/5  Left anterior tibial: 5/5  Right posterior tibial: 5/5  Left posterior tibial: 5/5    Sensory Exam   Light touch normal.     Gait, Coordination, and Reflexes     Gait  Gait: normal    Tremor   Resting tremor: absent  Action tremor: absent    Reflexes   Right brachioradialis: 2+  Left brachioradialis: 2+  Right biceps: 2+  Left biceps: 2+  Right triceps: 2+  Left triceps: 2+  Right patellar: 2+  Left patellar: 2+  Right achilles: 2+  Left achilles: 2+  Right : 2+  Left : 2+       Vital Signs:   Vitals:    07/27/21 1316   BP: 116/72   Pulse: 74   SpO2: 98%   Weight: 122 kg (269 lb)   Height: 172.7 cm (67.99\")     Body mass index is 40.91 kg/m².     Results:   Imaging:   MRI Brain With & Without Contrast    Result Date: 7/26/2021  1. No acute intracranial findings specifically no white matter disease signal process. 2. No abnormal enhancement on postcontrast sequences of the parenchyma.  D:  07/26/2021 E:  07/26/2021              Assessment / Plan      Assessment/Plan:   Diagnoses and all orders for this visit:    1. Chronic migraine without aura without status migrainosus, not intractable (Primary)  Comments:  Cont TPM add Beta blocker.  Cont " Maxalt  Will try CGRP if fails on above therapy  Orders:  -     metoprolol tartrate (Lopressor) 50 MG tablet; Take 1 tablet by mouth Daily.  Dispense: 30 tablet; Refill: 11           Patient Education:   I have discussed with the patient today the causes and overview of headaches. We discussed the different types of headaches to include tension-type headaches, Migraine headaches and Cluster headaches. We also discussed when headaches could or would be of a more serious condition such as brain infection, inflammation or bleeding within or around the brain. When to seek immediate medical attention or call 911.     FMLA papers completed today while she was in the office for intermittent leave.    Follow Up:   6 weeks      During this visit the following were done:  Labs Reviewed [x]    Labs Ordered []    Radiology Reports Reviewed [x]    Radiology Ordered []    PCP Records Reviewed []    Referring Provider Records Reviewed []    ER Records Reviewed []    Hospital Records Reviewed []    History Obtained From Family []    Radiology Images Reviewed [x]    Other Reviewed []    Records Requested []      Zaid Bobo, DNP, APRN

## 2021-07-29 ENCOUNTER — TELEPHONE (OUTPATIENT)
Dept: NEUROLOGY | Facility: CLINIC | Age: 31
End: 2021-07-29

## 2021-07-29 NOTE — TELEPHONE ENCOUNTER
Taken care of. Made patient aware via my chart this has been adjusted and faxed to [facility]. Thanks!

## 2021-07-29 NOTE — TELEPHONE ENCOUNTER
Caller: Brenton Vila    Relationship: Self    Best call back number: [phone]    What was the call regarding: PATIENT WOULD LIKE A RESPONSE TO HER PATIENT MESSAGE ONCE FMLA IS CORRECTED AND FAXED BACK TO EMPLOYER.      PT IS DUE FOR HER RE- CERTIFICATION AND NEEDS THIS ASAP-      THANKS

## 2021-08-02 ENCOUNTER — TELEPHONE (OUTPATIENT)
Dept: FAMILY MEDICINE CLINIC | Facility: CLINIC | Age: 31
End: 2021-08-02

## 2021-08-02 NOTE — TELEPHONE ENCOUNTER
Without seeing them I am not completely sure but the description of them sounds like that lipomas which are benign I would recommend waiting till you come in in September we can assess them.

## 2021-08-02 NOTE — TELEPHONE ENCOUNTER
----- Message from Brenton Vila sent at 7/31/2021  9:11 PM EDT -----  Regarding: Non-Urgent Medical Question  Contact: [phone]  I have had a lump on my leg for several years that is under the skin and you had said it was something to do with fatty tissue and it was nothing to worry about.  About a year ago something similar developed on my other thigh and a few months ago I started noticing them on my side, stomach, and back.  They are not huge, but the ones on my back and side are sore to touch and sometimes painful.  You can not see them from the surface.  Should I make an appointment or can this wait until my September appointment?

## 2021-10-01 RX ORDER — DULOXETIN HYDROCHLORIDE 60 MG/1
60 CAPSULE, DELAYED RELEASE ORAL DAILY
Qty: 30 CAPSULE | Refills: 0 | Status: SHIPPED | OUTPATIENT
Start: 2021-10-01 | End: 2021-11-02

## 2021-10-05 RX ORDER — L. ACIDOPHILUS/BIFID. ANIMALIS 31B CELL
CAPSULE ORAL
Qty: 30 CAPSULE | Refills: 0 | Status: SHIPPED | OUTPATIENT
Start: 2021-10-05 | End: 2021-11-01

## 2021-11-01 RX ORDER — L. ACIDOPHILUS/BIFID. ANIMALIS 31B CELL
CAPSULE ORAL
Qty: 30 CAPSULE | Refills: 11 | Status: SHIPPED | OUTPATIENT
Start: 2021-11-01

## 2021-11-02 RX ORDER — DULOXETIN HYDROCHLORIDE 60 MG/1
60 CAPSULE, DELAYED RELEASE ORAL DAILY
Qty: 30 CAPSULE | Refills: 3 | Status: SHIPPED | OUTPATIENT
Start: 2021-11-02 | End: 2022-03-04

## 2021-11-15 ENCOUNTER — LAB (OUTPATIENT)
Dept: LAB | Facility: HOSPITAL | Age: 31
End: 2021-11-15

## 2021-11-15 ENCOUNTER — OFFICE VISIT (OUTPATIENT)
Dept: FAMILY MEDICINE CLINIC | Facility: CLINIC | Age: 31
End: 2021-11-15

## 2021-11-15 VITALS
TEMPERATURE: 97.2 F | HEIGHT: 68 IN | SYSTOLIC BLOOD PRESSURE: 124 MMHG | BODY MASS INDEX: 43.65 KG/M2 | DIASTOLIC BLOOD PRESSURE: 72 MMHG | WEIGHT: 288 LBS | OXYGEN SATURATION: 98 % | HEART RATE: 80 BPM

## 2021-11-15 DIAGNOSIS — G43.009 MIGRAINE WITHOUT AURA AND WITHOUT STATUS MIGRAINOSUS, NOT INTRACTABLE: ICD-10-CM

## 2021-11-15 DIAGNOSIS — L02.93 RECURRENT BOILS: ICD-10-CM

## 2021-11-15 DIAGNOSIS — E55.9 VITAMIN D DEFICIENCY: ICD-10-CM

## 2021-11-15 DIAGNOSIS — N91.2 AMENORRHEA: ICD-10-CM

## 2021-11-15 DIAGNOSIS — L30.1 DYSHIDROTIC ECZEMA: ICD-10-CM

## 2021-11-15 DIAGNOSIS — E03.9 ACQUIRED HYPOTHYROIDISM: ICD-10-CM

## 2021-11-15 DIAGNOSIS — E03.9 ACQUIRED HYPOTHYROIDISM: Primary | ICD-10-CM

## 2021-11-15 LAB — HBA1C MFR BLD: 5.6 % (ref 4.8–5.6)

## 2021-11-15 PROCEDURE — 36415 COLL VENOUS BLD VENIPUNCTURE: CPT

## 2021-11-15 PROCEDURE — 84702 CHORIONIC GONADOTROPIN TEST: CPT

## 2021-11-15 PROCEDURE — 83036 HEMOGLOBIN GLYCOSYLATED A1C: CPT

## 2021-11-15 PROCEDURE — 86140 C-REACTIVE PROTEIN: CPT

## 2021-11-15 PROCEDURE — 82947 ASSAY GLUCOSE BLOOD QUANT: CPT

## 2021-11-15 PROCEDURE — 99214 OFFICE O/P EST MOD 30 MIN: CPT | Performed by: PHYSICIAN ASSISTANT

## 2021-11-15 PROCEDURE — 84439 ASSAY OF FREE THYROXINE: CPT

## 2021-11-15 PROCEDURE — 84443 ASSAY THYROID STIM HORMONE: CPT

## 2021-11-15 PROCEDURE — 82306 VITAMIN D 25 HYDROXY: CPT

## 2021-11-15 PROCEDURE — 85652 RBC SED RATE AUTOMATED: CPT

## 2021-11-15 RX ORDER — LEVOTHYROXINE SODIUM 0.03 MG/1
25 TABLET ORAL DAILY
Qty: 90 TABLET | Refills: 3 | Status: SHIPPED | OUTPATIENT
Start: 2021-11-15

## 2021-11-15 NOTE — PROGRESS NOTES
Subjective   Brenton Vila is a 31 y.o. female  Hypothyroidism (follow up on thyroid, refill on levothyroxine) and Follow-up (follow up on labs from neurologist, repeat Sed Rate, C-Reactive Protein and Vitamin D)      History of Present Illness  Patient is a 31-year-old female comes in today for follow-up on hypothyroidism.  She states she has admittedly not been taking medication as prescribed and ran out of it for couple of months, she also concerned about the recurrent boils that seem to be occurring on her thighs and under her arms.  She is having rebound with eczema on her hands and a rash on her left foot and concern about lack of menstrual cycle for the last couple of months.  She did a home pregnancy test which was negative.  She has labs checked by her neurologist whom she sees for migraines and was told she needed to have them repeated including a sed rate CRP and vitamin D level that was low.  She has been taking an over-the-counter vitamin D supplement of 5000 units daily.  The following portions of the patient's history were reviewed and updated as appropriate: allergies, current medications, past social history and problem list    Review of Systems   Constitutional: Positive for fatigue. Negative for fever and unexpected weight change.   Eyes: Negative for visual disturbance.   Respiratory: Negative.    Cardiovascular: Negative.  Negative for chest pain and palpitations.   Gastrointestinal: Negative for constipation and diarrhea.   Endocrine: Negative for cold intolerance and heat intolerance.   Genitourinary: Positive for menstrual problem. Negative for pelvic pain.   Musculoskeletal: Negative for arthralgias.   Skin: Positive for color change and rash. Negative for pallor and wound.   Neurological: Negative for tremors.   Psychiatric/Behavioral: Negative for agitation. The patient is not nervous/anxious.        Objective     Vitals:    11/15/21 1510   BP: 124/72   Pulse: 80   Temp: 97.2 °F (36.2 °C)    SpO2: 98%       Physical Exam  Vitals and nursing note reviewed.   Constitutional:       General: She is not in acute distress.     Appearance: Normal appearance. She is well-developed. She is not ill-appearing, toxic-appearing or diaphoretic.   HENT:      Head: Normocephalic and atraumatic.   Eyes:      Conjunctiva/sclera: Conjunctivae normal.   Neck:      Thyroid: No thyromegaly.      Vascular: No JVD.   Cardiovascular:      Rate and Rhythm: Normal rate and regular rhythm.   Pulmonary:      Effort: Pulmonary effort is normal. No respiratory distress.   Lymphadenopathy:      Cervical: No cervical adenopathy.   Skin:     General: Skin is warm and dry.      Findings: Rash present.      Comments: Eczematous rash on left foot and bilateral hands   Neurological:      Mental Status: She is alert and oriented to person, place, and time.   Psychiatric:         Mood and Affect: Mood normal.         Behavior: Behavior normal.         Assessment/Plan     Diagnoses and all orders for this visit:    1. Acquired hypothyroidism (Primary)  -     TSH; Future  -     T4, Free; Future    2. Vitamin D deficiency  -     Vitamin D 25 Hydroxy; Future    3. Recurrent boils  -     Hemoglobin A1c; Future  -     Glucose, Random; Future    4. Migraine without aura and without status migrainosus, not intractable  -     Sedimentation Rate; Future  -     C-reactive protein; Future    5. Amenorrhea  -     HCG, B-subunit, Quantitative; Future    6. Dyshidrotic eczema    Other orders  -     levothyroxine (SYNTHROID, LEVOTHROID) 25 MCG tablet; Take 1 tablet by mouth Daily.  Dispense: 90 tablet; Refill: 3  -     fluocinonide (LIDEX) 0.05 % cream; Apply 1 application topically to the appropriate area as directed 2 (Two) Times a Day. To rash on hands and feet  Dispense: 60 g; Refill: 2     Part of this note may be an electronic transcription/translation of spoken language to printed text using the Dragon Dictation System.      I will send a letter to  patient with my detailed interpretation of patient's labs after I myself have received and reviewed the above ordered labs.

## 2021-11-16 LAB
25(OH)D3 SERPL-MCNC: 27.7 NG/ML (ref 30–100)
CRP SERPL-MCNC: <0.3 MG/DL (ref 0–0.5)
ERYTHROCYTE [SEDIMENTATION RATE] IN BLOOD: 21 MM/HR (ref 0–20)
GLUCOSE SERPL-MCNC: 96 MG/DL (ref 65–99)
HCG INTACT+B SERPL-ACNC: <0.5 MIU/ML
T4 FREE SERPL-MCNC: 0.7 NG/DL (ref 0.93–1.7)
TSH SERPL DL<=0.05 MIU/L-ACNC: 3.69 UIU/ML (ref 0.27–4.2)

## 2021-12-10 ENCOUNTER — TELEPHONE (OUTPATIENT)
Dept: FAMILY MEDICINE CLINIC | Facility: CLINIC | Age: 31
End: 2021-12-10

## 2021-12-10 NOTE — TELEPHONE ENCOUNTER
I would recommend checking oxygen saturation levels of the drop below 90 go to the ER otherwise over-the-counter medication rest and fluids as a recommendation

## 2021-12-10 NOTE — TELEPHONE ENCOUNTER
----- Message from Brenton Vila sent at 12/10/2021  8:06 AM EST -----  Regarding: Positive Covid test  Mahnaz,  I tested positive for Covid-19 yesterday.  I was told to make you aware. I am doing pretty well except the coughing and shortness of breath.

## 2021-12-16 RX ORDER — OMEGA-3-ACID ETHYL ESTERS 1 G/1
2 CAPSULE, LIQUID FILLED ORAL DAILY
Qty: 60 CAPSULE | Refills: 5 | Status: SHIPPED | OUTPATIENT
Start: 2021-12-16 | End: 2022-04-04 | Stop reason: SDUPTHER

## 2022-03-04 RX ORDER — DULOXETIN HYDROCHLORIDE 60 MG/1
60 CAPSULE, DELAYED RELEASE ORAL DAILY
Qty: 30 CAPSULE | Refills: 2 | Status: SHIPPED | OUTPATIENT
Start: 2022-03-04 | End: 2022-03-29 | Stop reason: SDUPTHER

## 2022-03-30 NOTE — TELEPHONE ENCOUNTER
Caller: Brenton Vila    Relationship: Self    Best call back number: [phone]    Requested Prescriptions:   Requested Prescriptions     Pending Prescriptions Disp Refills   • DULoxetine (CYMBALTA) 60 MG capsule 30 capsule 2     Sig: Take 1 capsule by mouth Daily.        Pharmacy where request should be sent: [place], [place] - [address] [facility] [phone] [facility] [phone] FX     Additional details provided by patient: PATIENT IS OUT OF MEDICATION     Does the patient have less than a 3 day supply:  [x] Yes  [] No    Kathy Wong Rep   03/30/22 13:16 EDT

## 2022-03-30 NOTE — TELEPHONE ENCOUNTER
Caller: Brenton Vila    Relationship: Self    Best call back number:     Requested Prescriptions:   Requested Prescriptions     Pending Prescriptions Disp Refills   • DULoxetine (CYMBALTA) 60 MG capsule 30 capsule 2     Sig: Take 1 capsule by mouth Daily.        Pharmacy where request should be sent: [place], [place] - [address] [facility] - [phone] [facility] [phone] FX     Additional details provided by patient: PATIENT HAS BEEN WITHOUT HER MEDCIATION FOR 3 DAYS AND THE PHARMACY SAID THE PATIENT IS OUT OF REFILLS    Does the patient have less than a 3 day supply:  [x] Yes  [] No    Kathy Joy Rep   03/30/22 14:56 EDT

## 2022-03-31 RX ORDER — DULOXETIN HYDROCHLORIDE 60 MG/1
60 CAPSULE, DELAYED RELEASE ORAL DAILY
Qty: 30 CAPSULE | Refills: 11 | Status: SHIPPED | OUTPATIENT
Start: 2022-03-31

## 2022-04-04 ENCOUNTER — OFFICE VISIT (OUTPATIENT)
Dept: FAMILY MEDICINE CLINIC | Facility: CLINIC | Age: 32
End: 2022-04-04

## 2022-04-04 VITALS
OXYGEN SATURATION: 99 % | TEMPERATURE: 97.2 F | WEIGHT: 293 LBS | BODY MASS INDEX: 44.41 KG/M2 | HEIGHT: 68 IN | HEART RATE: 81 BPM | SYSTOLIC BLOOD PRESSURE: 132 MMHG | DIASTOLIC BLOOD PRESSURE: 84 MMHG

## 2022-04-04 DIAGNOSIS — E78.2 MIXED HYPERLIPIDEMIA: Primary | ICD-10-CM

## 2022-04-04 DIAGNOSIS — L30.9 DERMATITIS: ICD-10-CM

## 2022-04-04 DIAGNOSIS — F41.9 ANXIETY AND DEPRESSION: ICD-10-CM

## 2022-04-04 DIAGNOSIS — B35.1 ONYCHOMYCOSIS: ICD-10-CM

## 2022-04-04 DIAGNOSIS — J40 BRONCHITIS: ICD-10-CM

## 2022-04-04 DIAGNOSIS — F32.A ANXIETY AND DEPRESSION: ICD-10-CM

## 2022-04-04 PROCEDURE — 99214 OFFICE O/P EST MOD 30 MIN: CPT | Performed by: PHYSICIAN ASSISTANT

## 2022-04-04 RX ORDER — OMEGA-3-ACID ETHYL ESTERS 1 G/1
2 CAPSULE, LIQUID FILLED ORAL DAILY
Qty: 60 CAPSULE | Refills: 11 | Status: SHIPPED | OUTPATIENT
Start: 2022-04-04

## 2022-04-04 RX ORDER — PREDNISONE 20 MG/1
TABLET ORAL
COMMUNITY
Start: 2022-03-31

## 2022-04-04 RX ORDER — TOPIRAMATE 50 MG/1
TABLET, FILM COATED ORAL
COMMUNITY
Start: 2022-01-19

## 2022-04-04 RX ORDER — CEFDINIR 300 MG/1
300 CAPSULE ORAL 2 TIMES DAILY
Qty: 20 CAPSULE | Refills: 0 | Status: SHIPPED | OUTPATIENT
Start: 2022-04-04

## 2022-04-04 NOTE — PROGRESS NOTES
Subjective   Bernton Vila is a 32 y.o. female  Anxiety (Follow up on anxiety/depression, doing well on Cymbalta ) and Hyperlipidemia (Refill on Lovaza)      History of Present Illness  The patient is a 32-year-old female seen today for follow-up on anxiety and depression, as well as hyperlipidemia. She is due for medicine refills.    She states that her anxiety and depression are well controlled. She is taking duloxetine as prescribed without any issues.       The patient reports awaking in the middle of the night with a fever of 103 on 03/30/2022. She was seen in the ER at that time and tested for RSV, COVID-19 infection, influenza virus, and all tests revealed negative results. Additionally, she reports symptoms of shortness of breath with wheezing. She denies having a chest x-ray or an examination of the chest. She reports being prescribed prednisone for her symptoms with no relief. On 03/31/2022, the patient reports awaking with a cough, congestion and describes soreness of her ribs bilaterally. She notes that her cough is productive at times, and describes a yellowish-greenish mucus that started around 04/02/2022. She denies receiving any antibiotic medication while in the hospital or after being discharged. She is still smoking. She denies having asthma or needing to use inhalers.    The patient is taking Lovaza as prescribed for cholesterol control. She presents fasting today for routine labs.     She complains of a rash on her left foot that has been present for months. Her symptoms started since her previous visit and blisters were observed of the foot. She applies topical cream medication to her foot without improvement. She feels that her symptoms are worsening. She complains of experiencing occasional left foot itchiness and soreness.    She relays that her thumb nail is detaching and coming loose. The patient denies seeing a regular dermatologist.     She denies having any known allergies to  medication.    The following portions of the patient's history were reviewed and updated as appropriate: allergies, current medications, past social history and problem list    Review of Systems   Constitutional: Negative for chills, fatigue and fever.   HENT: Positive for congestion.    Respiratory: Positive for cough and wheezing. Negative for chest tightness and shortness of breath.    Cardiovascular: Negative for chest pain.   Gastrointestinal: Negative for nausea.   Skin: Positive for color change and rash.        Persistent rash on left foot and right thumb nail   Psychiatric/Behavioral:        Anxiety depression stable on medication       Objective     Vitals:    04/04/22 0919   BP: 132/84   Pulse: 81   Temp: 97.2 °F (36.2 °C)   SpO2: 99%       Physical Exam  Vitals and nursing note reviewed.   Constitutional:       General: She is not in acute distress.     Appearance: Normal appearance. She is well-developed. She is not ill-appearing, toxic-appearing or diaphoretic.   HENT:      Head: Normocephalic and atraumatic.      Nose: Nose normal.   Eyes:      Conjunctiva/sclera: Conjunctivae normal.   Neck:      Vascular: No JVD.   Cardiovascular:      Rate and Rhythm: Normal rate and regular rhythm.      Heart sounds: Normal heart sounds. No murmur heard.  Pulmonary:      Effort: Pulmonary effort is normal. No respiratory distress.      Breath sounds: No stridor. Wheezing present.   Musculoskeletal:      Cervical back: Neck supple.   Lymphadenopathy:      Cervical: No cervical adenopathy.   Skin:     General: Skin is dry.      Coloration: Skin is not pale.      Findings: Rash ( Erythematous scaly rash left foot near heel, discoloration and distortion of nail on right thumb) present. No erythema.   Neurological:      Mental Status: She is alert and oriented to person, place, and time.      Coordination: Coordination normal.   Psychiatric:         Attention and Perception: She is attentive.         Mood and Affect:  Mood normal.         Speech: Speech normal.         Behavior: Behavior normal.         Thought Content: Thought content normal.         Judgment: Judgment normal.         Assessment/Plan     Diagnoses and all orders for this visit:    1. Mixed hyperlipidemia (Primary)  - She will continue taking Lovaza as prescribed for cholesterol control.  - Patient to obtain routine fasting labs today as discussed. Orders placed.   -     Lipid Panel; Future    2. Dermatitis  - Referral placed to dermatology at this time for further evaluation and treatment.   -     Ambulatory Referral to Dermatology.     3. Onychomycosis  - Referral placed to dermatology at this time for further evaluation and treatment.    - Ambulatory Referral to Dermatology    4. Bronchitis  - Patient to begin taking Omnicef and Chlorcyclizine-Pseudoephed as prescribed. Prescriptions sent to her preferred pharmacy.    - Strongly encouraged smoking cessation.     5. Anxiety and depression  - Stable. Continue taking medication as prescribed. I have refilled the patient's Cymbalta for 1 year.        Other orders  -     cefdinir (OMNICEF) 300 MG capsule; Take 1 capsule by mouth 2 (Two) Times a Day.  Dispense: 20 capsule; Refill: 0  -     Chlorcyclizine-Pseudoephed 25-60 MG tablet; Take 1/2 to 1 every 12 hours as needed for congestion  Dispense: 14 tablet; Refill: 1  -     omega-3 acid ethyl esters (LOVAZA) 1 g capsule; Take 2 capsules by mouth Daily. For high cholesterol  Dispense: 60 capsule; Refill: 11        Transcribed from ambient dictation for Mahnaz Dutton PA-C by Nella Norwood.  04/04/22   13:32 EDT    Patient verbalized consent to the visit recording.

## 2022-05-10 NOTE — PROGRESS NOTES
Subjective   Chief Complaint   Patient presents with   • Gynecologic Exam     Brenton Vila is a 28 y.o. year old  presenting to be seen for her annual exam.  2 months ago she took 150 mg and had an elevated progesterone level on day 26.  The following month she did not appear to ovulate on that dose.  This month she took 200 mg of clomiphene.  This month she did have a progesterone that was about 7.  Currently we are waiting for her menses.    SEXUAL Hx:  She is currently sexually active.  In the past year there there has been NO new sexual partners.    Condoms are never used.  She would not like to be screened for STD's at today's exam.  Current birth control method: not using any form of contraception because she is currently trying to conceive.  She is happy with her current method of contraception and does not want to discuss alternative methods of contraception.  HEALTH Hx:  She exercises regularly: yes.  She wears her seat belt: yes.  She has concerns about domestic violence: no.  OTHER THINGS SHE WANTS TO DISCUSS TODAY:  Nothing else    The following portions of the patient's history were reviewed and updated as appropriate:problem list, current medications, allergies, past family history, past medical history, past social history and past surgical history.    Smoking status: Current Some Day Smoker                                                    Packs/day: 0.50      Years: 0.00         Types: Cigarettes     Start date:      Last attempt to quit: 2017  Smokeless tobacco: Never Used                        Review of Systems  Constitutional POS: nothing reported    NEG: anorexia or night sweats   Genitourinary POS: nothing reported    NEG: dysuria or hematuria      Gastointestinal POS: nothing reported    NEG: bloating, change in bowel habits, melena or reflux symptoms   Integument POS: nothing reported    NEG: moles that are changing in size, shape, color or rashes   Breast POS: nothing  reported    NEG: persistent breast lump, skin dimpling or nipple discharge        Objective   /84   Resp 14   Wt 117 kg (257 lb)   LMP 10/03/2018 (Approximate)   Breastfeeding? No   BMI 37.95 kg/m²     General:  well developed; well nourished  no acute distress   Skin:  No suspicious lesions seen   Thyroid: normal to inspection and palpation   Breasts:  Examined in supine position  Symmetric without masses or skin dimpling  Nipples normal without inversion, lesions or discharge  There are no palpable axillary nodes   Abdomen: soft, non-tender; no masses  no umbilical or inginual hernias are present  no hepato-splenomegaly   Pelvis: Clinical staff was present for exam  External genitalia:  normal appearance of the external genitalia including Bartholin's and Yorkshire's glands.  :  urethral meatus normal;  Vaginal:  normal pink mucosa without prolapse or lesions.  Cervix:  normal appearance.  Uterus:  normal size, shape and consistency.  Adnexa:  non palpable bilaterally.  Rectal:  digital rectal exam not performed; anus visually normal appearing.        Assessment   1. Normal GYN exam limited by weight  2. 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  Ovulation has been sporadic on doses up to 200 mg  3. History of mild dysplasia of cervix in follow-up     Plan   1. Pap was done today.  If she does not receive the results of the Pap within 2 weeks  time, she was instructed to call to find out the results.  I explained to Brenton that because she is being seen in follow-up of a previously abnormal Pap smear, her next Pap needs to be performed in 4-6 months.  She will need to be seen roughly every 6 months until 3 consecutive normal Paps have been obtained.  At that point, she can return to routine screening intervals.  2. If does not menstruate at the end of next week she'll call for hCG levels  3. If does not appear to ovulate consistently this month, would probably take  a break from Clomid and begin Glucophage to do with hyperinsulinism.  After on appropriate doses of Glucophage would then resume Clomid  4. The importance of keeping all planned follow-up and taking all medications as prescribed was emphasized.  5. Follow up for repeat PAP smear 4-6 months           This note was electronically signed.    Johny Barakat M.D.  November 9, 2018    Note: Speech recognition transcription software may have been used to create portions of this document.  An attempt at proofreading has been made but errors in transcription could still be present.   Biopsy Photograph Reviewed: Yes

## 2022-08-02 DIAGNOSIS — G43.709 CHRONIC MIGRAINE WITHOUT AURA WITHOUT STATUS MIGRAINOSUS, NOT INTRACTABLE: ICD-10-CM

## 2022-08-02 RX ORDER — RIZATRIPTAN BENZOATE 10 MG/1
10 TABLET ORAL ONCE AS NEEDED
Qty: 9 TABLET | Refills: 5 | Status: SHIPPED | OUTPATIENT
Start: 2022-08-02

## 2022-08-02 RX ORDER — METOPROLOL TARTRATE 50 MG/1
50 TABLET, FILM COATED ORAL DAILY
Qty: 30 TABLET | Refills: 0 | Status: SHIPPED | OUTPATIENT
Start: 2022-08-02 | End: 2023-08-02

## 2022-08-02 NOTE — TELEPHONE ENCOUNTER
Rx Refill Note  Requested Prescriptions     Pending Prescriptions Disp Refills   • metoprolol tartrate (LOPRESSOR) 50 MG tablet [Pharmacy Med Name: METOPROL TAR 50MG] 30 tablet 10     Sig: TAKE 1 TABLET BY MOUTH DAILY.      Last filled: 07/27/2021 30 with 11 refills.  Last office visit with prescribing clinician: 7/27/2021      Next office visit with prescribing clinician: Visit date not found     Sent in 30 with 0 refills.  Patient needs follow up appointment for further refills.    Ivana Dennis MA  08/02/22, 11:11 EDT

## 2022-09-06 DIAGNOSIS — G43.709 CHRONIC MIGRAINE WITHOUT AURA WITHOUT STATUS MIGRAINOSUS, NOT INTRACTABLE: ICD-10-CM

## 2022-09-06 RX ORDER — METOPROLOL TARTRATE 50 MG/1
50 TABLET, FILM COATED ORAL DAILY
Qty: 30 TABLET | Refills: 10 | OUTPATIENT
Start: 2022-09-06 | End: 2023-09-06

## 2022-09-06 NOTE — TELEPHONE ENCOUNTER
Dr RAMIREZ pt wants Pap results from last week   Rifampin Pregnancy And Lactation Text: This medication is Pregnancy Category C and it isn't know if it is safe during pregnancy. It is also excreted in breast milk and should not be used if you are breast feeding.

## 2022-09-06 NOTE — TELEPHONE ENCOUNTER
Rx Refill Note  Requested Prescriptions     Pending Prescriptions Disp Refills   • metoprolol tartrate (LOPRESSOR) 50 MG tablet [Pharmacy Med Name: METOPROL TAR 50MG] 30 tablet 10     Sig: TAKE 1 TABLET BY MOUTH DAILY.      Last filled: 08/02/2022 30 with 0 refills.  Last office visit with prescribing clinician: 7/27/2021      Next office visit with prescribing clinician: Visit date not found     Patient needs appointment for further refills.    Ivana Dennis MA  09/06/22, 15:22 EDT

## 2022-10-17 ENCOUNTER — TELEPHONE (OUTPATIENT)
Dept: FAMILY MEDICINE CLINIC | Facility: CLINIC | Age: 32
End: 2022-10-17

## 2022-10-17 DIAGNOSIS — L73.2 HIDRADENITIS SUPPURATIVA: Primary | ICD-10-CM

## 2022-10-17 NOTE — TELEPHONE ENCOUNTER
----- Message from Brenton Vila sent at 10/17/2022 11:42 AM EDT -----  Regarding: ER visit  Contact: [phone]  I was seen in the ER for a boil that was lanced. The doctor called it Olivia Lopez. He referred me to general surgery- Dr. Valenzuela at [facility], but they do not deal with the condition. They said I would need to be referred to a dermatologist and they would do the procedure because I may need a skin graft. Can you please refer me so I can get this issue taken care of?  Thanks

## 2022-10-17 NOTE — TELEPHONE ENCOUNTER
Rona, I'm going to place a referral to derm for this patient, please see what you can do to get her into someone soon.

## 2023-02-20 ENCOUNTER — TELEPHONE (OUTPATIENT)
Dept: FAMILY MEDICINE CLINIC | Facility: CLINIC | Age: 33
End: 2023-02-20
Payer: COMMERCIAL

## 2023-02-20 NOTE — TELEPHONE ENCOUNTER
----- Message from Brenton Vila sent at 2/20/2023 11:58 AM EST -----  Regarding: Note  Contact: [phone]  Hey,  My  was diagnosed with the flu two weeks ago. I ended up getting sick right after him.  I didn't go to the doctor because there was nothing that could be done for the flu. Can you write me a note for the 11th. My employer is asking for it.

## 2023-04-19 RX ORDER — DULOXETIN HYDROCHLORIDE 60 MG/1
60 CAPSULE, DELAYED RELEASE ORAL DAILY
Qty: 30 CAPSULE | Refills: 0 | Status: SHIPPED | OUTPATIENT
Start: 2023-04-19 | End: 2023-04-20 | Stop reason: SDUPTHER

## 2023-04-20 ENCOUNTER — OFFICE VISIT (OUTPATIENT)
Dept: FAMILY MEDICINE CLINIC | Facility: CLINIC | Age: 33
End: 2023-04-20
Payer: COMMERCIAL

## 2023-04-20 VITALS
TEMPERATURE: 97.5 F | WEIGHT: 289.7 LBS | DIASTOLIC BLOOD PRESSURE: 76 MMHG | BODY MASS INDEX: 42.91 KG/M2 | SYSTOLIC BLOOD PRESSURE: 128 MMHG | HEART RATE: 60 BPM | OXYGEN SATURATION: 96 % | HEIGHT: 69 IN

## 2023-04-20 DIAGNOSIS — E66.01 CLASS 3 SEVERE OBESITY DUE TO EXCESS CALORIES WITH SERIOUS COMORBIDITY AND BODY MASS INDEX (BMI) OF 40.0 TO 44.9 IN ADULT: ICD-10-CM

## 2023-04-20 DIAGNOSIS — G43.709 CHRONIC MIGRAINE WITHOUT AURA WITHOUT STATUS MIGRAINOSUS, NOT INTRACTABLE: Primary | ICD-10-CM

## 2023-04-20 DIAGNOSIS — F32.A ANXIETY AND DEPRESSION: ICD-10-CM

## 2023-04-20 DIAGNOSIS — F41.9 ANXIETY AND DEPRESSION: ICD-10-CM

## 2023-04-20 DIAGNOSIS — E03.9 ACQUIRED HYPOTHYROIDISM: ICD-10-CM

## 2023-04-20 DIAGNOSIS — E78.2 MIXED HYPERLIPIDEMIA: ICD-10-CM

## 2023-04-20 DIAGNOSIS — E55.9 VITAMIN D DEFICIENCY: ICD-10-CM

## 2023-04-20 PROCEDURE — 99214 OFFICE O/P EST MOD 30 MIN: CPT | Performed by: PHYSICIAN ASSISTANT

## 2023-04-20 RX ORDER — TOPIRAMATE 50 MG/1
50 TABLET, FILM COATED ORAL DAILY
Qty: 90 TABLET | Refills: 3 | Status: SHIPPED | OUTPATIENT
Start: 2023-04-20

## 2023-04-20 RX ORDER — PHENTERMINE HYDROCHLORIDE 37.5 MG/1
37.5 CAPSULE ORAL EVERY MORNING
Qty: 30 CAPSULE | Refills: 2 | Status: SHIPPED | OUTPATIENT
Start: 2023-04-20

## 2023-04-20 RX ORDER — METOPROLOL TARTRATE 50 MG/1
50 TABLET, FILM COATED ORAL DAILY
Qty: 90 TABLET | Refills: 3 | Status: SHIPPED | OUTPATIENT
Start: 2023-04-20 | End: 2024-04-19

## 2023-04-20 RX ORDER — DULOXETIN HYDROCHLORIDE 60 MG/1
60 CAPSULE, DELAYED RELEASE ORAL DAILY
Qty: 90 CAPSULE | Refills: 3 | Status: SHIPPED | OUTPATIENT
Start: 2023-04-20

## 2023-04-20 RX ORDER — ERGOCALCIFEROL 1.25 MG/1
50000 CAPSULE ORAL WEEKLY
Qty: 5 CAPSULE | Refills: 11 | Status: SHIPPED | OUTPATIENT
Start: 2023-04-20

## 2023-04-20 RX ORDER — RIZATRIPTAN BENZOATE 10 MG/1
10 TABLET ORAL ONCE AS NEEDED
Qty: 9 TABLET | Refills: 11 | Status: SHIPPED | OUTPATIENT
Start: 2023-04-20

## 2023-04-20 RX ORDER — LEVOTHYROXINE SODIUM 0.03 MG/1
25 TABLET ORAL DAILY
Qty: 90 TABLET | Refills: 3 | Status: SHIPPED | OUTPATIENT
Start: 2023-04-20

## 2023-04-20 NOTE — PROGRESS NOTES
Subjective   Flory Vila is a 33 y.o. female  Obesity (Discuss weight loss options ), Migraine (Refill Maxalt and metoprolol ( used for migraines)), Hypothyroidism (Refill on levothyroxine, non compliant on medication ), and Vitamin D Deficiency (Refill on vitamin D, suboxone doctor did recent labs and patient is low )      History of Present Illness    FLORY VILA, date of birth 1990, presents today with multiple concerns to be addressed, medication refills needed, follow up on hypothyroidism, migraines, vitamin D deficiency, and obesity.    The patient reports that she has not been taking her thyroid medication in a while. She denies any side effects when she takes her thyroid medication.    The patient reports that she ran out of her metoprolol 50 mg 1 tablet a day for her migraines. She states that it does help keep her migraines under control. The patient reports that she is still taking Topamax 50 mg 1 tablet a day. She states that the generic Maxalt works well for her migraines, but she has to take it before the headache starts.    The patient reports that she is not currently taking a vitamin D supplement. She states that she is taking over-the-counter fish oil for her cholesterol. The patient reports that she is taking Cymbalta once a day.    The patient reports that she has tried phentermine in the past and it works well for her.    The following portions of the patient's history were reviewed and updated as appropriate: allergies, current medications, past social history and problem list    Review of Systems   Constitutional: Positive for activity change, appetite change, fatigue and unexpected weight change.   Respiratory: Negative.    Cardiovascular: Negative for chest pain.   Gastrointestinal: Negative for abdominal distention, abdominal pain, diarrhea and nausea.   Psychiatric/Behavioral: Negative for dysphoric mood. The patient is not nervous/anxious.        Objective     Vitals:    04/20/23  1517   BP: 128/76   Pulse: 60   Temp: 97.5 °F (36.4 °C)   SpO2: 96%       Physical Exam  Vitals and nursing note reviewed.   Constitutional:       General: She is not in acute distress.     Appearance: Normal appearance. She is well-developed. She is obese. She is not ill-appearing, toxic-appearing or diaphoretic.      Comments: Obesity noted  BMI42   Neck:      Thyroid: No thyromegaly.   Cardiovascular:      Rate and Rhythm: Normal rate and regular rhythm.      Heart sounds: Normal heart sounds. No murmur heard.  Pulmonary:      Effort: Pulmonary effort is normal. No respiratory distress.      Breath sounds: Normal breath sounds.   Abdominal:      Palpations: Abdomen is soft. There is no mass.      Tenderness: There is no abdominal tenderness.   Neurological:      Mental Status: She is alert.   Psychiatric:         Mood and Affect: Mood normal.         Behavior: Behavior normal.         Thought Content: Thought content normal.         Judgment: Judgment normal.         Assessment & Plan     Diagnoses and all orders for this visit:    1. Chronic migraine without aura without status migrainosus, not intractable (Primary)  Comments:  Cont TPM add Beta blocker.  Cont Maxalt  Will try CGRP if fails on above therapy  Orders:  -     metoprolol tartrate (LOPRESSOR) 50 MG tablet; Take 1 tablet by mouth Daily. For migraines  Dispense: 90 tablet; Refill: 3    2. Class 3 severe obesity due to excess calories with serious comorbidity and body mass index (BMI) of 40.0 to 44.9 in adult  -     phentermine 37.5 MG capsule; Take 1 capsule by mouth Every Morning. For weight loss, BMI 42  Dispense: 30 capsule; Refill: 2    3. Mixed hyperlipidemia    4. Anxiety and depression    5. Vitamin D deficiency    6. Acquired hypothyroidism    Other orders  -     rizatriptan (MAXALT) 10 MG tablet; Take 1 tablet by mouth 1 (One) Time As Needed for Migraine. May repeat in 2 hours if needed  Dispense: 9 tablet; Refill: 11  -     levothyroxine  (SYNTHROID, LEVOTHROID) 25 MCG tablet; Take 1 tablet by mouth Daily.  Dispense: 90 tablet; Refill: 3  -     topiramate (TOPAMAX) 50 MG tablet; Take 1 tablet by mouth Daily.  Dispense: 90 tablet; Refill: 3  -     vitamin D (ERGOCALCIFEROL) 1.25 MG (00599 UT) capsule capsule; Take 1 capsule by mouth 1 (One) Time Per Week.  Dispense: 5 capsule; Refill: 11  -     DULoxetine (CYMBALTA) 60 MG capsule; Take 1 capsule by mouth Daily.  Dispense: 90 capsule; Refill: 3      1. Hypothyroidism  - The patient will restart her thyroid medication daily.    2. Migraines  - The patient will continue Topamax 50 mg daily.    3. Vitamin D deficiency  - The patient will restart vitamin D 50,000 units once a week.    4. Obesity  - The patient will start phentermine.     As part of this patient's treatment plan, patient will be prescribed controlled substances. The patient has been made aware of appropriate use of such medications, including potential risk of somnolence, limited ability to drive and /or work safely, and potential for dependence or overdose. It has also been made clear that these medications are for use by this patient only, without concomitant use of alcohol or other substances unless prescribed.Controlled substance status of medication discussed with patient, discussed risks of medication including abuse potential and diversion potential and need to follow up for reevaluation appointment in order to receive further refills.    Part of this note may be an electronic transcription/translation of spoken language to printed text using the Dragon Dictation System.     Discussed importance of following a low calorie low-carb high-protein diet with adequate fiber, increased water intake and increase daily exercise for weight loss.  Follow-up for annual physical and recheck of weight in June.   Will recheck thyroid labs at that time    Transcribed from ambient dictation for Mahnaz Dutton PA-C by Yael Nixon.  04/20/23    18:30 EDT    Patient or patient representative verbalized consent to the visit recording.  I have personally performed the services described in this document as transcribed by the above individual, and it is both accurate and complete.  Mahnaz Dutton PA-C  4/21/2023  12:58 EDT

## 2023-08-07 ENCOUNTER — OFFICE VISIT (OUTPATIENT)
Dept: FAMILY MEDICINE CLINIC | Facility: CLINIC | Age: 33
End: 2023-08-07
Payer: COMMERCIAL

## 2023-08-07 VITALS
WEIGHT: 271.8 LBS | OXYGEN SATURATION: 99 % | HEART RATE: 78 BPM | DIASTOLIC BLOOD PRESSURE: 72 MMHG | HEIGHT: 68 IN | SYSTOLIC BLOOD PRESSURE: 128 MMHG | BODY MASS INDEX: 41.19 KG/M2 | TEMPERATURE: 97.8 F

## 2023-08-07 DIAGNOSIS — E66.01 CLASS 3 SEVERE OBESITY DUE TO EXCESS CALORIES WITH SERIOUS COMORBIDITY AND BODY MASS INDEX (BMI) OF 40.0 TO 44.9 IN ADULT: Primary | ICD-10-CM

## 2023-08-07 PROCEDURE — 99213 OFFICE O/P EST LOW 20 MIN: CPT | Performed by: PHYSICIAN ASSISTANT

## 2023-08-07 RX ORDER — SEMAGLUTIDE 1.7 MG/.75ML
1.7 INJECTION, SOLUTION SUBCUTANEOUS WEEKLY
Qty: 3 ML | Refills: 3 | Status: SHIPPED | OUTPATIENT
Start: 2023-08-07

## 2023-08-07 NOTE — PROGRESS NOTES
Subjective   Brenton Vila is a 33 y.o. female  Weight Check (Follow up on weight with Wegovy, possible dose increase)      History of Present Illness  Brenton Vila, date of birth 1990, presents today for follow-up on weight loss management association with morbid obesity. Her BMI is 41.33 today, which is down from a BMI of 42.8 on Wegovy. She is accompanied by her daughter.    The patient has lost 19 to 20 pounds since her last visit. She denies any significant nausea with the medication. The patient denies any constipation. She is drinking plenty of water.    The following portions of the patient's history were reviewed and updated as appropriate: allergies, current medications, past social history and problem list    Review of Systems   Constitutional:  Positive for appetite change.   Respiratory: Negative.     Cardiovascular: Negative.    Gastrointestinal: Negative.    Neurological: Negative.      Objective     Vitals:    08/07/23 0952   BP: 128/72   Pulse: 78   Temp: 97.8 øF (36.6 øC)   SpO2: 99%       Physical Exam  Vitals and nursing note reviewed.   Constitutional:       General: She is not in acute distress.     Appearance: Normal appearance. She is well-developed. She is obese. She is not ill-appearing, toxic-appearing or diaphoretic.      Comments: [id number] noted     Neck:      Thyroid: No thyromegaly.   Cardiovascular:      Rate and Rhythm: Normal rate and regular rhythm.      Heart sounds: Normal heart sounds. No murmur heard.  Pulmonary:      Effort: Pulmonary effort is normal. No respiratory distress.      Breath sounds: Normal breath sounds.   Abdominal:      Palpations: Abdomen is soft. There is no mass.      Tenderness: There is no abdominal tenderness.   Neurological:      Mental Status: She is alert.   Psychiatric:         Mood and Affect: Mood normal.         Behavior: Behavior normal.         Thought Content: Thought content normal.         Judgment: Judgment normal.       Assessment &  Plan     Diagnoses and all orders for this visit:    1. Class 3 severe obesity due to excess calories with serious comorbidity and body mass index (BMI) of 40.0 to 44.9 in adult (Primary)    Other orders  -     Semaglutide-Weight Management (Wegovy) 1.7 MG/0.75ML solution auto-injector; Inject 0.75 mL under the skin into the appropriate area as directed 1 (One) Time Per Week.  Dispense: 3 mL; Refill: 3    Advised patient to make sure to continue to follow a healthy high-protein diet with adequate fiber intake adequate water intake regular exercise and overall low calorie target 1500 jorge luis/day follow-up for recheck in 3 months    1. Weight loss management association with morbid obesity  - We will increase her Wegovy to 1.7 mg.    Transcribed from ambient dictation for Mahnaz Dutton PA-C by Tomas Torres.  08/07/23   11:33 EDT    Patient or patient representative verbalized consent to the visit recording.  I have personally performed the services described in this document as transcribed by the above individual, and it is both accurate and complete.

## 2023-09-14 ENCOUNTER — TELEPHONE (OUTPATIENT)
Dept: FAMILY MEDICINE CLINIC | Facility: CLINIC | Age: 33
End: 2023-09-14
Payer: COMMERCIAL

## 2023-09-14 NOTE — TELEPHONE ENCOUNTER
There are no longer any official requirements up to individual employers please have her contact her employer and find out exactly how long she needs to be off work.  Also we are not able to provide a work note unless she has an appointment with us that can certainly be virtual but we need to have documentation through an office note that we are evaluating her for this to be able to provide a note to her employer putting her off work

## 2023-09-14 NOTE — TELEPHONE ENCOUNTER
----- Message from Brenton Vila sent at 9/14/2023  2:16 PM EDT -----  Regarding: Covid positive  Contact: [phone]  Mahnaz,  I tested positive for covid this morning.  Can you send me a note for the days I will be out. My symptoms started yesterday. I don't really know the guidelines anymore.

## 2023-11-27 RX ORDER — SEMAGLUTIDE 1.7 MG/.75ML
1.7 INJECTION, SOLUTION SUBCUTANEOUS WEEKLY
Qty: 3 ML | Refills: 2 | Status: SHIPPED | OUTPATIENT
Start: 2023-11-27 | End: 2023-12-04 | Stop reason: DRUGHIGH

## 2023-12-04 ENCOUNTER — OFFICE VISIT (OUTPATIENT)
Dept: FAMILY MEDICINE CLINIC | Facility: CLINIC | Age: 33
End: 2023-12-04
Payer: COMMERCIAL

## 2023-12-04 VITALS
TEMPERATURE: 97.8 F | OXYGEN SATURATION: 98 % | WEIGHT: 258.4 LBS | HEIGHT: 68 IN | DIASTOLIC BLOOD PRESSURE: 64 MMHG | SYSTOLIC BLOOD PRESSURE: 112 MMHG | HEART RATE: 71 BPM | BODY MASS INDEX: 39.16 KG/M2

## 2023-12-04 DIAGNOSIS — E78.2 MIXED HYPERLIPIDEMIA: ICD-10-CM

## 2023-12-04 DIAGNOSIS — E66.01 CLASS 2 SEVERE OBESITY WITH SERIOUS COMORBIDITY AND BODY MASS INDEX (BMI) OF 39.0 TO 39.9 IN ADULT, UNSPECIFIED OBESITY TYPE: Primary | ICD-10-CM

## 2023-12-04 DIAGNOSIS — K76.0 STEATOSIS OF LIVER: ICD-10-CM

## 2023-12-04 RX ORDER — SEMAGLUTIDE 2.4 MG/.75ML
2.4 INJECTION, SOLUTION SUBCUTANEOUS WEEKLY
Qty: 3 ML | Refills: 3 | Status: SHIPPED | OUTPATIENT
Start: 2023-12-04

## 2023-12-04 NOTE — PROGRESS NOTES
Subjective   Brenton Vila is a 33 y.o. female  Weight Check (Follow up on weight with Wegovy, wants to proceed with 2mg dose/)      History of Present Illness  Brenton Vila, date of birth 1990, presents today for a follow-up on weight loss management in association with obesity. Her BMI today is 39.29, down from her last appointment 3 months ago where her BMI was 41.33. Please see notes from 08/07/2023.    She has lost 30 to 40 pounds since her last visit. She denies any side effects from the Wegovy.     The following portions of the patient's history were reviewed and updated as appropriate: allergies, current medications, past social history and problem list    Review of Systems   Constitutional:  Positive for appetite change.   Respiratory: Negative.     Cardiovascular: Negative.    Gastrointestinal: Negative.    Neurological: Negative.        Objective     Vitals:    12/04/23 0905   BP: 112/64   Pulse: 71   Temp: 97.8 °F (36.6 °C)   SpO2: 98%       Physical Exam  Vitals and nursing note reviewed.   Constitutional:       General: She is not in acute distress.     Appearance: Normal appearance. She is well-developed. She is obese. She is not ill-appearing, toxic-appearing or diaphoretic.      Comments: [id number] noted     Neck:      Thyroid: No thyromegaly.   Cardiovascular:      Rate and Rhythm: Normal rate and regular rhythm.      Heart sounds: Normal heart sounds. No murmur heard.  Pulmonary:      Effort: Pulmonary effort is normal. No respiratory distress.      Breath sounds: Normal breath sounds.   Abdominal:      Palpations: Abdomen is soft. There is no mass.      Tenderness: There is no abdominal tenderness.   Neurological:      Mental Status: She is alert.   Psychiatric:         Mood and Affect: Mood normal.         Behavior: Behavior normal.         Thought Content: Thought content normal.         Judgment: Judgment normal.         Assessment & Plan     Diagnoses and all orders for this  visit:    1. Class 2 severe obesity with serious comorbidity and body mass index (BMI) of 39.0 to 39.9 in adult, unspecified obesity type (Primary)    2. Steatosis of liver    3. Mixed hyperlipidemia    Other orders  -     Semaglutide-Weight Management (Wegovy) 2.4 MG/0.75ML solution auto-injector; Inject 2.4 mg under the skin into the appropriate area as directed 1 (One) Time Per Week.  Dispense: 3 mL; Refill: 3    1. Weight loss management in association with obesity  - Her Wegovy has been increased to 2.4 mg daily.  - She will follow up in 3 months.      Transcribed from ambient dictation for Mahnaz Dutton PA-C by Jayleen Butler.  12/04/23   11:45 EST    Patient or patient representative verbalized consent to the visit recording.  I have personally performed the services described in this document as transcribed by the above individual, and it is both accurate and complete.

## 2023-12-13 NOTE — PROGRESS NOTES
Subjective   Brenton Vila is a 27 y.o. female    History of Present Illness    Patient presents today for follow-up on weight loss management.  Please see previous office notes.  She is on phentermine right now and has reduced her weight this past month from 254-240.  She states the phentermine is helping her tremendously with appetite control and energy.  She is now walking 5 miles per day, 2 in the morning and 3 at night.  She currently is following a low calorie diet but is typically only having fruit for breakfast and for lunch.  She denies any adverse effects from medication.  Weight goal is 165.  She is drinking mainly water.  The following portions of the patient's history were reviewed and updated as appropriate: allergies, current medications, past social history and problem list    Review of Systems   Constitutional: Negative for activity change, appetite change and unexpected weight change.   Cardiovascular: Negative for chest pain.   Gastrointestinal: Negative for abdominal distention, abdominal pain, diarrhea and nausea.   Psychiatric/Behavioral: Negative for dysphoric mood. The patient is not nervous/anxious.        Objective     Vitals:    04/12/17 0759   BP: 126/86   Pulse: 71   Temp: 97.9 °F (36.6 °C)   SpO2: 99%       Physical Exam   Constitutional: She appears well-developed and well-nourished.   Obesity noted     Neck: No thyromegaly present.   Cardiovascular: Normal rate and regular rhythm.    Pulmonary/Chest: Effort normal and breath sounds normal.   Abdominal: Soft. There is no tenderness.   Psychiatric: She has a normal mood and affect. Her behavior is normal. Judgment and thought content normal.   Nursing note and vitals reviewed.      Assessment/Plan     Diagnoses and all orders for this visit:    Obesity (BMI 30-39.9)   refilled phentermine 37.5 mg 1 daily #30 with no refills.  Discussed abuse potential, controlled substance agreement signed.  Discussed common side effects.  I counseled  patient to switch her diet at breakfast and lunch to focus on high protein and less carbs will continuing to follow a low calorie diet and exercise.  Recheck in one month.   Yes...

## 2024-02-08 ENCOUNTER — TELEPHONE (OUTPATIENT)
Dept: FAMILY MEDICINE CLINIC | Facility: CLINIC | Age: 34
End: 2024-02-08
Payer: COMMERCIAL

## 2024-02-08 NOTE — TELEPHONE ENCOUNTER
Notify patient there is a national backorder on this and what everyone is having to do is wait until the medication comes back in the best thing to do is stay in touch with her pharmacist but we are being told it may be April before he comes back in stock.

## 2024-02-08 NOTE — TELEPHONE ENCOUNTER
----- Message from Brenton Vila sent at 2/8/2024 12:33 PM EST -----  Regarding: Wegovy  Contact: [phone]  I called to refill my prescription of Wegovy and my pharmacy can't get it now. I called several others and none have it. What should I do?

## 2024-03-14 ENCOUNTER — OFFICE VISIT (OUTPATIENT)
Dept: FAMILY MEDICINE CLINIC | Facility: CLINIC | Age: 34
End: 2024-03-14
Payer: COMMERCIAL

## 2024-03-14 VITALS
BODY MASS INDEX: 37.83 KG/M2 | TEMPERATURE: 97.7 F | SYSTOLIC BLOOD PRESSURE: 126 MMHG | HEART RATE: 84 BPM | DIASTOLIC BLOOD PRESSURE: 80 MMHG | HEIGHT: 68 IN | WEIGHT: 249.6 LBS | OXYGEN SATURATION: 96 %

## 2024-03-14 DIAGNOSIS — F41.1 GENERALIZED ANXIETY DISORDER: Primary | ICD-10-CM

## 2024-03-14 DIAGNOSIS — E66.01 CLASS 2 SEVERE OBESITY WITH SERIOUS COMORBIDITY AND BODY MASS INDEX (BMI) OF 37.0 TO 37.9 IN ADULT, UNSPECIFIED OBESITY TYPE: ICD-10-CM

## 2024-03-14 DIAGNOSIS — E03.9 ACQUIRED HYPOTHYROIDISM: ICD-10-CM

## 2024-03-14 DIAGNOSIS — G43.009 MIGRAINE WITHOUT AURA AND WITHOUT STATUS MIGRAINOSUS, NOT INTRACTABLE: ICD-10-CM

## 2024-03-14 RX ORDER — LEVOTHYROXINE SODIUM 0.03 MG/1
25 TABLET ORAL DAILY
Qty: 90 TABLET | Refills: 3 | Status: SHIPPED | OUTPATIENT
Start: 2024-03-14

## 2024-03-14 RX ORDER — DULOXETIN HYDROCHLORIDE 60 MG/1
60 CAPSULE, DELAYED RELEASE ORAL DAILY
Qty: 90 CAPSULE | Refills: 3 | Status: SHIPPED | OUTPATIENT
Start: 2024-03-14

## 2024-03-14 RX ORDER — TOPIRAMATE 50 MG/1
50 TABLET, FILM COATED ORAL DAILY
Qty: 90 TABLET | Refills: 3 | Status: SHIPPED | OUTPATIENT
Start: 2024-03-14

## 2024-03-14 RX ORDER — ERGOCALCIFEROL 1.25 MG/1
50000 CAPSULE ORAL WEEKLY
Qty: 5 CAPSULE | Refills: 11 | Status: SHIPPED | OUTPATIENT
Start: 2024-03-14

## 2024-03-14 RX ORDER — SEMAGLUTIDE 2.4 MG/.75ML
2.4 INJECTION, SOLUTION SUBCUTANEOUS WEEKLY
Qty: 3 ML | Refills: 5 | Status: SHIPPED | OUTPATIENT
Start: 2024-03-14

## 2024-03-14 NOTE — PROGRESS NOTES
6Subjective   Brenton Vila is a 34 y.o. female  Obesity (Refill on Wegovy ) and Vitamin D Deficiency (Refill on Vitamin D)      History of Present Illness  Patient comes in today for follow-up on weight loss management, hypothyroidism, migraines, vitamin D deficiency, doing very well is lost over 60 pounds since last year.  Eating healthier, no adverse effects from Wegovy, bowels are moving normally no nausea, no adverse effects from medications noted.    The patient is a 34-year-old female who is coming in for follow-up on weight loss medication in association with obesity. BMI is down to 37.95. She is due for refills of Wegovy. She is also following up on vitamin D deficiency.    She has relief with Wegovy. She denies any constipation, significant nausea, or dysphagia. She has been on Wegovy since 06/2023. She was taking phentermine before switching to Wegovy. She weighed 310 pounds when she started the medication and lost 60 pounds.    She is taking topiramate. She has not been taking rizatriptan. She is taking over-the-counter fish oil. She is still taking her thyroid medication. She is still taking Cymbalta.    The following portions of the patient's history were reviewed and updated as appropriate: allergies, current medications, past social history and problem list    Review of Systems   Constitutional:  Positive for activity change and appetite change. Negative for fatigue and unexpected weight change.   Eyes:  Negative for visual disturbance.   Cardiovascular:  Negative for chest pain and palpitations.   Gastrointestinal: Negative.  Negative for constipation and diarrhea.   Endocrine: Negative for cold intolerance and heat intolerance.   Neurological:  Negative for tremors and headaches (stable on medication).   Psychiatric/Behavioral:  Negative for agitation. The patient is not nervous/anxious (stable on medication).        Objective     Vitals:    03/14/24 1337   BP: 126/80   Pulse: 84   Temp: 97.7 °F  (36.5 °C)   SpO2: 96%       Physical Exam  Vitals and nursing note reviewed.   Constitutional:       General: She is not in acute distress.     Appearance: Normal appearance. She is well-developed. She is obese. She is not ill-appearing, toxic-appearing or diaphoretic.      Comments: [id number] noted     Neck:      Thyroid: No thyromegaly.   Cardiovascular:      Rate and Rhythm: Normal rate and regular rhythm.      Heart sounds: Normal heart sounds. No murmur heard.  Pulmonary:      Effort: Pulmonary effort is normal. No respiratory distress.      Breath sounds: Normal breath sounds.   Abdominal:      Palpations: Abdomen is soft. There is no mass.      Tenderness: There is no abdominal tenderness.   Neurological:      Mental Status: She is alert.   Psychiatric:         Mood and Affect: Mood normal.         Behavior: Behavior normal.         Thought Content: Thought content normal.         Judgment: Judgment normal.         Assessment & Plan     Diagnoses and all orders for this visit:    1. Generalized anxiety disorder (Primary)    2. Migraine without aura and without status migrainosus, not intractable    3. Class 2 severe obesity with serious comorbidity and body mass index (BMI) of 37.0 to 37.9 in adult, unspecified obesity type    4. Acquired hypothyroidism    Other orders  -     topiramate (TOPAMAX) 50 MG tablet; Take 1 tablet by mouth Daily.  Dispense: 90 tablet; Refill: 3  -     vitamin D (ERGOCALCIFEROL) 1.25 MG (07735 UT) capsule capsule; Take 1 capsule by mouth 1 (One) Time Per Week.  Dispense: 5 capsule; Refill: 11  -     levothyroxine (SYNTHROID, LEVOTHROID) 25 MCG tablet; Take 1 tablet by mouth Daily.  Dispense: 90 tablet; Refill: 3  -     DULoxetine (CYMBALTA) 60 MG capsule; Take 1 capsule by mouth Daily.  Dispense: 90 capsule; Refill: 3  -     Semaglutide-Weight Management (Wegovy) 2.4 MG/0.75ML solution auto-injector; Inject 2.4 mg under the skin into the appropriate area as directed 1 (One) Time  Per Week.  Dispense: 3 mL; Refill: 5       1. Obesity.  She has lost 60 pounds since 06/2023. She is doing well on the Wegovy. I will send a 6-month supply of Wegovy. She was advised to drink plenty of water, eat a healthy high-protein low calorie diet and include plenty of fruit and vegetables.    She will continue topiramate.    Follow-up  The patient will follow up in 6 months.    Answers submitted by the patient for this visit:  Other (Submitted on 3/13/2024)  Please describe your symptoms.: Medicine and knee pain  Have you had these symptoms before?: Yes  How long have you been having these symptoms?: Greater than 2 weeks  Please list any medications you are currently taking for this condition.: Same  Please describe any probable cause for these symptoms. : Na  Primary Reason for Visit (Submitted on 3/13/2024)  What is the primary reason for your visit?: Other      Transcribed from ambient dictation for Mahnaz Dutton PA-C by Supa Downing.  03/14/24   15:20 EDT    Patient or patient representative verbalized consent to the visit recording.  I have personally performed the services described in this document as transcribed by the above individual, and it is both accurate and complete.

## 2024-05-06 DIAGNOSIS — G43.709 CHRONIC MIGRAINE WITHOUT AURA WITHOUT STATUS MIGRAINOSUS, NOT INTRACTABLE: ICD-10-CM

## 2024-05-06 RX ORDER — METOPROLOL TARTRATE 50 MG/1
50 TABLET, FILM COATED ORAL DAILY
Qty: 90 TABLET | Refills: 3 | Status: SHIPPED | OUTPATIENT
Start: 2024-05-06 | End: 2025-05-06

## 2024-09-16 RX ORDER — SEMAGLUTIDE 2.4 MG/.75ML
INJECTION, SOLUTION SUBCUTANEOUS
Qty: 3 ML | Refills: 5 | Status: SHIPPED | OUTPATIENT
Start: 2024-09-16

## 2024-09-19 ENCOUNTER — TELEPHONE (OUTPATIENT)
Dept: FAMILY MEDICINE CLINIC | Facility: CLINIC | Age: 34
End: 2024-09-19
Payer: COMMERCIAL

## 2025-03-11 ENCOUNTER — OFFICE VISIT (OUTPATIENT)
Dept: FAMILY MEDICINE CLINIC | Facility: CLINIC | Age: 35
End: 2025-03-11
Payer: COMMERCIAL

## 2025-03-11 VITALS
OXYGEN SATURATION: 97 % | HEIGHT: 68 IN | HEART RATE: 95 BPM | DIASTOLIC BLOOD PRESSURE: 64 MMHG | WEIGHT: 286.4 LBS | TEMPERATURE: 97.6 F | SYSTOLIC BLOOD PRESSURE: 114 MMHG | BODY MASS INDEX: 43.41 KG/M2

## 2025-03-11 DIAGNOSIS — G43.709 CHRONIC MIGRAINE WITHOUT AURA WITHOUT STATUS MIGRAINOSUS, NOT INTRACTABLE: ICD-10-CM

## 2025-03-11 DIAGNOSIS — E03.9 HYPOTHYROIDISM (ACQUIRED): ICD-10-CM

## 2025-03-11 DIAGNOSIS — K21.9 GASTROESOPHAGEAL REFLUX DISEASE, UNSPECIFIED WHETHER ESOPHAGITIS PRESENT: Primary | ICD-10-CM

## 2025-03-11 DIAGNOSIS — E66.01 CLASS 3 SEVERE OBESITY WITH SERIOUS COMORBIDITY AND BODY MASS INDEX (BMI) OF 40.0 TO 44.9 IN ADULT, UNSPECIFIED OBESITY TYPE: ICD-10-CM

## 2025-03-11 DIAGNOSIS — E66.813 CLASS 3 SEVERE OBESITY WITH SERIOUS COMORBIDITY AND BODY MASS INDEX (BMI) OF 40.0 TO 44.9 IN ADULT, UNSPECIFIED OBESITY TYPE: ICD-10-CM

## 2025-03-11 DIAGNOSIS — F41.1 GENERALIZED ANXIETY DISORDER: ICD-10-CM

## 2025-03-11 RX ORDER — RIZATRIPTAN BENZOATE 10 MG/1
10 TABLET ORAL ONCE AS NEEDED
Qty: 9 TABLET | Refills: 11 | Status: SHIPPED | OUTPATIENT
Start: 2025-03-11

## 2025-03-11 RX ORDER — DULOXETIN HYDROCHLORIDE 60 MG/1
60 CAPSULE, DELAYED RELEASE ORAL DAILY
Qty: 90 CAPSULE | Refills: 3 | Status: SHIPPED | OUTPATIENT
Start: 2025-03-11

## 2025-03-11 RX ORDER — LEVOTHYROXINE SODIUM 25 UG/1
25 TABLET ORAL DAILY
Qty: 90 TABLET | Refills: 3 | Status: SHIPPED | OUTPATIENT
Start: 2025-03-11

## 2025-03-11 RX ORDER — METOPROLOL TARTRATE 50 MG
50 TABLET ORAL DAILY
Qty: 90 TABLET | Refills: 3 | Status: SHIPPED | OUTPATIENT
Start: 2025-03-11 | End: 2026-03-11

## 2025-03-11 RX ORDER — RABEPRAZOLE SODIUM 20 MG/1
20 TABLET, DELAYED RELEASE ORAL DAILY
Qty: 30 TABLET | Refills: 5 | Status: SHIPPED | OUTPATIENT
Start: 2025-03-11

## 2025-03-11 RX ORDER — TOPIRAMATE 50 MG/1
50 TABLET, FILM COATED ORAL DAILY
Qty: 90 TABLET | Refills: 3 | Status: SHIPPED | OUTPATIENT
Start: 2025-03-11

## 2025-03-11 NOTE — PROGRESS NOTES
Subjective   Brenton Vila is a 35 y.o. female  Obesity (Wants to start zepbound ), Migraine (Refill on maxalt for migraines ), and Anxiety (Refill on duloxetine )      History of Present Illness  History of Present Illness  The patient is a 35-year-old female presenting today for multiple medical concerns.    She reports experiencing severe regurgitation of stomach acid, which has been progressively worsening over the past month. The symptoms are predominantly at night and in the morning, often disrupting her sleep due to choking episodes. She describes a burning sensation accompanied by throat pain. Despite taking Nexium, she has not found relief. She adheres to a dietary regimen that excludes food intake after 6:00 PM. She has no history of ulcers and does not experience vomiting or difficulty swallowing. Her bowel movements are regular, with no presence of blood or diarrhea.    She discontinued the use of Wegovy due to associated fatigue and sluggishness but notes an exacerbation of her acid reflux symptoms since stopping the medication. She monitors her blood sugar levels, which remain within normal limits.    She continues to take Maxalt for migraines, topiramate, metoprolol, thyroid medication, and duloxetine.    MEDICATIONS  Current: Nexium, Maxalt, topiramate, metoprolol, thyroid medicine, duloxetine  Discontinued: Wegovy    The following portions of the patient's history were reviewed and updated as appropriate: allergies, current medications, past social history and problem list    Review of Systems   Constitutional:  Positive for appetite change and unexpected weight change. Negative for fatigue.   Respiratory: Negative.     Cardiovascular:  Negative for chest pain.   Gastrointestinal:  Negative for abdominal distention, abdominal pain, blood in stool, constipation, diarrhea, nausea and vomiting.        Patient experiencing heartburn/acid reflux     Neurological:  Positive for headaches (stable on  medication).   Psychiatric/Behavioral:  Positive for sleep disturbance. Negative for dysphoric mood. The patient is not nervous/anxious.         ELIS stable on medication       Objective     Vitals:    03/11/25 1352   BP: 114/64   Pulse: 95   Temp: 97.6 °F (36.4 °C)   SpO2: 97%       Physical Exam  Vitals and nursing note reviewed.   Constitutional:       General: She is not in acute distress.     Appearance: Normal appearance. She is well-developed. She is obese. She is not ill-appearing, toxic-appearing or diaphoretic.      Comments: [id number] noted     Neck:      Thyroid: No thyromegaly.   Cardiovascular:      Rate and Rhythm: Normal rate and regular rhythm.      Heart sounds: Normal heart sounds. No murmur heard.  Pulmonary:      Effort: Pulmonary effort is normal. No respiratory distress.      Breath sounds: Normal breath sounds.   Abdominal:      Palpations: Abdomen is soft. There is no mass.      Tenderness: There is no abdominal tenderness.   Skin:     General: Skin is warm and dry.      Coloration: Skin is not pale.   Neurological:      Mental Status: She is alert.   Psychiatric:         Mood and Affect: Mood normal.         Behavior: Behavior normal.         Thought Content: Thought content normal.         Judgment: Judgment normal.       Physical Exam      Assessment & Plan   Assessment & Plan  1. Gastroesophageal Reflux Disease (GERD).  She reports worsening symptoms of acid reflux, including regurgitation and throat burning, despite taking over-the-counter Nexium. She is advised to avoid eating or drinking significant amounts of liquid for 2 hours before bedtime. A prescription for a stronger acid reflux medication will be sent to [facility]. If there is no improvement within 2 weeks, a referral for an endoscopy will be considered to check for esophageal narrowing.    2. Medication Management.  She is currently taking Maxalt (rizatriptan) for migraines, topiramate, metoprolol, thyroid medication, and  duloxetine. A year's worth of these medications will be refilled and sent to [facility].    3. Weight Management.  She previously stopped taking Wegovy due to sluggishness and tiredness. A prescription for Zepbound will be sent, starting with the lowest dose and gradually increasing every 3 weeks if tolerated. She is instructed to report any worsening of stomach symptoms or sluggishness while on Zepbound.    Diagnoses and all orders for this visit:    1. Gastroesophageal reflux disease, unspecified whether esophagitis present (Primary)    2. Chronic migraine without aura without status migrainosus, not intractable  -     metoprolol tartrate (LOPRESSOR) 50 MG tablet; Take 1 tablet by mouth Daily. For migraines  Dispense: 90 tablet; Refill: 3    3. Class 3 severe obesity with serious comorbidity and body mass index (BMI) of 40.0 to 44.9 in adult, unspecified obesity type    4. Generalized anxiety disorder    5. Hypothyroidism (acquired)    Other orders  -     rizatriptan (MAXALT) 10 MG tablet; Take 1 tablet by mouth 1 (One) Time As Needed for Migraine. May repeat in 2 hours if needed  Dispense: 9 tablet; Refill: 11  -     topiramate (TOPAMAX) 50 MG tablet; Take 1 tablet by mouth Daily.  Dispense: 90 tablet; Refill: 3  -     levothyroxine (SYNTHROID, LEVOTHROID) 25 MCG tablet; Take 1 tablet by mouth Daily.  Dispense: 90 tablet; Refill: 3  -     DULoxetine (CYMBALTA) 60 MG capsule; Take 1 capsule by mouth Daily.  Dispense: 90 capsule; Refill: 3  -     Tirzepatide-Weight Management (ZEPBOUND) 2.5 MG/0.5ML solution auto-injector; Inject 0.5 mL under the skin into the appropriate area as directed 1 (One) Time Per Week.  Dispense: 2 mL; Refill: 1  -     RABEprazole (Aciphex) 20 MG EC tablet; Take 1 tablet by mouth Daily. Take with breakfast for GERD  Dispense: 30 tablet; Refill: 5         Patient or patient representative verbalized consent for the use of Ambient Listening during the visit with  Mahnaz Dutton PA-C for  chart documentation. 3/11/2025  14:19 EDT